# Patient Record
Sex: FEMALE | Race: WHITE | NOT HISPANIC OR LATINO | Employment: OTHER | URBAN - METROPOLITAN AREA
[De-identification: names, ages, dates, MRNs, and addresses within clinical notes are randomized per-mention and may not be internally consistent; named-entity substitution may affect disease eponyms.]

---

## 2017-01-10 ENCOUNTER — OFFICE VISIT - HEALTHEAST (OUTPATIENT)
Dept: FAMILY MEDICINE | Facility: CLINIC | Age: 82
End: 2017-01-10

## 2017-01-10 ENCOUNTER — COMMUNICATION - HEALTHEAST (OUTPATIENT)
Dept: FAMILY MEDICINE | Facility: CLINIC | Age: 82
End: 2017-01-10

## 2017-01-10 DIAGNOSIS — R35.0 URINARY FREQUENCY: ICD-10-CM

## 2017-01-10 DIAGNOSIS — R42 VERTIGO: ICD-10-CM

## 2017-01-10 DIAGNOSIS — C73 MALIGNANT NEOPLASM OF THYROID GLAND (H): ICD-10-CM

## 2017-01-10 ASSESSMENT — MIFFLIN-ST. JEOR: SCORE: 1037.25

## 2017-01-11 ENCOUNTER — COMMUNICATION - HEALTHEAST (OUTPATIENT)
Dept: FAMILY MEDICINE | Facility: CLINIC | Age: 82
End: 2017-01-11

## 2017-03-08 ENCOUNTER — COMMUNICATION - HEALTHEAST (OUTPATIENT)
Dept: FAMILY MEDICINE | Facility: CLINIC | Age: 82
End: 2017-03-08

## 2017-03-08 DIAGNOSIS — R42 VERTIGO: ICD-10-CM

## 2017-03-13 ENCOUNTER — OFFICE VISIT - HEALTHEAST (OUTPATIENT)
Dept: FAMILY MEDICINE | Facility: CLINIC | Age: 82
End: 2017-03-13

## 2017-03-13 DIAGNOSIS — H83.03 VESTIBULITIS OF EAR, BILATERAL: ICD-10-CM

## 2017-03-13 DIAGNOSIS — R42 VERTIGO: ICD-10-CM

## 2018-01-29 ENCOUNTER — COMMUNICATION - HEALTHEAST (OUTPATIENT)
Dept: FAMILY MEDICINE | Facility: CLINIC | Age: 83
End: 2018-01-29

## 2018-01-29 DIAGNOSIS — C73 MALIGNANT NEOPLASM OF THYROID GLAND (H): ICD-10-CM

## 2018-01-31 ENCOUNTER — COMMUNICATION - HEALTHEAST (OUTPATIENT)
Dept: FAMILY MEDICINE | Facility: CLINIC | Age: 83
End: 2018-01-31

## 2018-02-06 ENCOUNTER — OFFICE VISIT - HEALTHEAST (OUTPATIENT)
Dept: FAMILY MEDICINE | Facility: CLINIC | Age: 83
End: 2018-02-06

## 2018-02-06 DIAGNOSIS — R42 VERTIGO: ICD-10-CM

## 2018-02-06 DIAGNOSIS — C73 MALIGNANT NEOPLASM OF THYROID GLAND (H): ICD-10-CM

## 2018-02-06 DIAGNOSIS — R03.0 ELEVATED BLOOD PRESSURE READING: ICD-10-CM

## 2018-07-02 ENCOUNTER — COMMUNICATION - HEALTHEAST (OUTPATIENT)
Dept: FAMILY MEDICINE | Facility: CLINIC | Age: 83
End: 2018-07-02

## 2018-07-02 DIAGNOSIS — Z90.11 H/O RIGHT MASTECTOMY: ICD-10-CM

## 2018-07-10 ENCOUNTER — COMMUNICATION - HEALTHEAST (OUTPATIENT)
Dept: FAMILY MEDICINE | Facility: CLINIC | Age: 83
End: 2018-07-10

## 2018-07-10 DIAGNOSIS — Z90.11 H/O MASTECTOMY, RIGHT: ICD-10-CM

## 2018-07-10 DIAGNOSIS — C73 MALIGNANT NEOPLASM OF THYROID GLAND (H): ICD-10-CM

## 2019-02-11 ENCOUNTER — COMMUNICATION - HEALTHEAST (OUTPATIENT)
Dept: FAMILY MEDICINE | Facility: CLINIC | Age: 84
End: 2019-02-11

## 2019-02-11 DIAGNOSIS — C73 MALIGNANT NEOPLASM OF THYROID GLAND (H): ICD-10-CM

## 2019-03-11 ENCOUNTER — OFFICE VISIT - HEALTHEAST (OUTPATIENT)
Dept: FAMILY MEDICINE | Facility: CLINIC | Age: 84
End: 2019-03-11

## 2019-03-11 DIAGNOSIS — I48.20 CHRONIC ATRIAL FIBRILLATION (H): ICD-10-CM

## 2019-03-11 DIAGNOSIS — Z00.00 ENCOUNTER FOR MEDICARE ANNUAL WELLNESS EXAM: ICD-10-CM

## 2019-03-11 DIAGNOSIS — C73 MALIGNANT NEOPLASM OF THYROID GLAND (H): ICD-10-CM

## 2019-03-11 LAB
ALBUMIN SERPL-MCNC: 4.1 G/DL (ref 3.5–5)
ALBUMIN UR-MCNC: ABNORMAL MG/DL
ALP SERPL-CCNC: 67 U/L (ref 45–120)
ALT SERPL W P-5'-P-CCNC: 15 U/L (ref 0–45)
ANION GAP SERPL CALCULATED.3IONS-SCNC: 10 MMOL/L (ref 5–18)
APPEARANCE UR: ABNORMAL
AST SERPL W P-5'-P-CCNC: 15 U/L (ref 0–40)
ATRIAL RATE - MUSE: 72 BPM
BACTERIA #/AREA URNS HPF: ABNORMAL HPF
BILIRUB SERPL-MCNC: 0.6 MG/DL (ref 0–1)
BILIRUB UR QL STRIP: NEGATIVE
BUN SERPL-MCNC: 15 MG/DL (ref 8–28)
CALCIUM SERPL-MCNC: 10.2 MG/DL (ref 8.5–10.5)
CHLORIDE BLD-SCNC: 104 MMOL/L (ref 98–107)
CO2 SERPL-SCNC: 27 MMOL/L (ref 22–31)
COLOR UR AUTO: YELLOW
CREAT SERPL-MCNC: 0.87 MG/DL (ref 0.6–1.1)
DIASTOLIC BLOOD PRESSURE - MUSE: NORMAL MMHG
ERYTHROCYTE [DISTWIDTH] IN BLOOD BY AUTOMATED COUNT: 12.4 % (ref 11–14.5)
GFR SERPL CREATININE-BSD FRML MDRD: >60 ML/MIN/1.73M2
GLUCOSE BLD-MCNC: 89 MG/DL (ref 70–125)
GLUCOSE UR STRIP-MCNC: NEGATIVE MG/DL
HCT VFR BLD AUTO: 43.7 % (ref 35–47)
HGB BLD-MCNC: 13.9 G/DL (ref 12–16)
HGB UR QL STRIP: NEGATIVE
INTERPRETATION ECG - MUSE: NORMAL
KETONES UR STRIP-MCNC: NEGATIVE MG/DL
LEUKOCYTE ESTERASE UR QL STRIP: ABNORMAL
MCH RBC QN AUTO: 29 PG (ref 27–34)
MCHC RBC AUTO-ENTMCNC: 31.8 G/DL (ref 32–36)
MCV RBC AUTO: 91 FL (ref 80–100)
NITRATE UR QL: NEGATIVE
P AXIS - MUSE: 74 DEGREES
PH UR STRIP: 6.5 [PH] (ref 5–8)
PLATELET # BLD AUTO: 244 THOU/UL (ref 140–440)
PMV BLD AUTO: 8.7 FL (ref 7–10)
POTASSIUM BLD-SCNC: 4.8 MMOL/L (ref 3.5–5)
PR INTERVAL - MUSE: 200 MS
PROT SERPL-MCNC: 7.1 G/DL (ref 6–8)
QRS DURATION - MUSE: 82 MS
QT - MUSE: 396 MS
QTC - MUSE: 433 MS
R AXIS - MUSE: -5 DEGREES
RBC # BLD AUTO: 4.79 MILL/UL (ref 3.8–5.4)
RBC #/AREA URNS AUTO: ABNORMAL HPF
SODIUM SERPL-SCNC: 141 MMOL/L (ref 136–145)
SP GR UR STRIP: 1.02 (ref 1–1.03)
SQUAMOUS #/AREA URNS AUTO: ABNORMAL LPF
SYSTOLIC BLOOD PRESSURE - MUSE: NORMAL MMHG
T AXIS - MUSE: 45 DEGREES
T4 FREE SERPL-MCNC: 1.3 NG/DL (ref 0.7–1.8)
TSH SERPL DL<=0.005 MIU/L-ACNC: 0.23 UIU/ML (ref 0.3–5)
UROBILINOGEN UR STRIP-ACNC: ABNORMAL
VENTRICULAR RATE- MUSE: 72 BPM
WBC #/AREA URNS AUTO: ABNORMAL HPF
WBC: 7 THOU/UL (ref 4–11)

## 2019-03-11 RX ORDER — MECLIZINE HCL 12.5 MG 12.5 MG/1
12.5 TABLET ORAL 3 TIMES DAILY PRN
Qty: 30 TABLET | Refills: 0 | Status: SHIPPED | OUTPATIENT
Start: 2019-03-11

## 2019-03-11 ASSESSMENT — MIFFLIN-ST. JEOR: SCORE: 991.22

## 2019-03-12 LAB — BACTERIA SPEC CULT: NORMAL

## 2019-08-08 ENCOUNTER — OFFICE VISIT - HEALTHEAST (OUTPATIENT)
Dept: GERIATRICS | Facility: CLINIC | Age: 84
End: 2019-08-08

## 2019-08-08 DIAGNOSIS — G93.40 ACUTE ENCEPHALOPATHY: ICD-10-CM

## 2019-08-08 DIAGNOSIS — N30.00 ACUTE CYSTITIS WITHOUT HEMATURIA: ICD-10-CM
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
## 2019-08-08 DIAGNOSIS — G60.3 IDIOPATHIC PROGRESSIVE NEUROPATHY: ICD-10-CM

## 2019-08-08 DIAGNOSIS — E03.9 HYPOTHYROIDISM, UNSPECIFIED TYPE: ICD-10-CM

## 2019-08-09 ENCOUNTER — RECORDS - HEALTHEAST (OUTPATIENT)
Dept: LAB | Facility: CLINIC | Age: 84
End: 2019-08-09

## 2019-08-09 ENCOUNTER — OFFICE VISIT - HEALTHEAST (OUTPATIENT)
Dept: GERIATRICS | Facility: CLINIC | Age: 84
End: 2019-08-09
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
## 2019-08-09 DIAGNOSIS — R53.81 PHYSICAL DEBILITY: ICD-10-CM

## 2019-08-09 DIAGNOSIS — G60.9 HEREDITARY AND IDIOPATHIC PERIPHERAL NEUROPATHY: ICD-10-CM

## 2019-08-09 DIAGNOSIS — I48.0 PAROXYSMAL ATRIAL FIBRILLATION (H): ICD-10-CM

## 2019-08-09 LAB — TSH SERPL DL<=0.005 MIU/L-ACNC: 1.46 UIU/ML (ref 0.3–5)

## 2019-08-12 ENCOUNTER — OFFICE VISIT - HEALTHEAST (OUTPATIENT)
Dept: GERIATRICS | Facility: CLINIC | Age: 84
End: 2019-08-12

## 2019-08-12 DIAGNOSIS — R07.81 RIB PAIN: ICD-10-CM
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
## 2019-08-12 DIAGNOSIS — G60.9 HEREDITARY AND IDIOPATHIC PERIPHERAL NEUROPATHY: ICD-10-CM

## 2019-08-12 DIAGNOSIS — R53.81 PHYSICAL DEBILITY: ICD-10-CM

## 2019-08-14 ENCOUNTER — OFFICE VISIT - HEALTHEAST (OUTPATIENT)
Dept: GERIATRICS | Facility: CLINIC | Age: 84
End: 2019-08-14

## 2019-08-14 DIAGNOSIS — R26.81 GAIT INSTABILITY: ICD-10-CM
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
## 2019-08-14 DIAGNOSIS — R53.81 PHYSICAL DEBILITY: ICD-10-CM

## 2019-08-14 DIAGNOSIS — R12 PYROSIS: ICD-10-CM

## 2019-08-19 ENCOUNTER — OFFICE VISIT - HEALTHEAST (OUTPATIENT)
Dept: GERIATRICS | Facility: CLINIC | Age: 84
End: 2019-08-19

## 2019-08-19 DIAGNOSIS — I10 HYPERTENSION, UNSPECIFIED TYPE: ICD-10-CM

## 2019-08-19 DIAGNOSIS — R10.13 DYSPEPSIA: ICD-10-CM

## 2019-08-19 DIAGNOSIS — R53.81 PHYSICAL DEBILITY: ICD-10-CM

## 2019-08-19 DIAGNOSIS — G60.9 HEREDITARY AND IDIOPATHIC PERIPHERAL NEUROPATHY: ICD-10-CM

## 2019-08-21 ENCOUNTER — HOME CARE/HOSPICE - HEALTHEAST (OUTPATIENT)
Dept: HOME HEALTH SERVICES | Facility: HOME HEALTH | Age: 84
End: 2019-08-21

## 2019-08-21 ENCOUNTER — OFFICE VISIT - HEALTHEAST (OUTPATIENT)
Dept: GERIATRICS | Facility: CLINIC | Age: 84
End: 2019-08-21

## 2019-08-21 DIAGNOSIS — G60.9 HEREDITARY AND IDIOPATHIC PERIPHERAL NEUROPATHY: ICD-10-CM
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
## 2019-08-21 DIAGNOSIS — E03.9 HYPOTHYROIDISM, UNSPECIFIED TYPE: ICD-10-CM

## 2019-08-21 DIAGNOSIS — K51.919 ULCERATIVE COLITIS WITH COMPLICATION, UNSPECIFIED LOCATION (H): ICD-10-CM

## 2019-08-23 ENCOUNTER — AMBULATORY - HEALTHEAST (OUTPATIENT)
Dept: GERIATRICS | Facility: CLINIC | Age: 84
End: 2019-08-23

## 2019-08-23 ENCOUNTER — COMMUNICATION - HEALTHEAST (OUTPATIENT)
Dept: GERIATRICS | Facility: CLINIC | Age: 84
End: 2019-08-23

## 2019-08-23 ENCOUNTER — COMMUNICATION - HEALTHEAST (OUTPATIENT)
Dept: HOME HEALTH SERVICES | Facility: HOME HEALTH | Age: 84
End: 2019-08-23

## 2019-08-25 ENCOUNTER — HOME CARE/HOSPICE - HEALTHEAST (OUTPATIENT)
Dept: HOME HEALTH SERVICES | Facility: HOME HEALTH | Age: 84
End: 2019-08-25

## 2019-08-25 ENCOUNTER — COMMUNICATION - HEALTHEAST (OUTPATIENT)
Dept: HOME HEALTH SERVICES | Facility: HOME HEALTH | Age: 84
End: 2019-08-25

## 2019-08-25 ENCOUNTER — RECORDS - HEALTHEAST (OUTPATIENT)
Dept: ADMINISTRATIVE | Facility: OTHER | Age: 84
End: 2019-08-25

## 2019-08-26 ENCOUNTER — HOME CARE/HOSPICE - HEALTHEAST (OUTPATIENT)
Dept: HOME HEALTH SERVICES | Facility: HOME HEALTH | Age: 84
End: 2019-08-26

## 2019-08-26 ENCOUNTER — COMMUNICATION - HEALTHEAST (OUTPATIENT)
Dept: NURSING | Facility: CLINIC | Age: 84
End: 2019-08-26

## 2019-08-26 ENCOUNTER — COMMUNICATION - HEALTHEAST (OUTPATIENT)
Dept: PHYSICAL THERAPY | Facility: CLINIC | Age: 84
End: 2019-08-26

## 2019-08-26 ENCOUNTER — AMBULATORY - HEALTHEAST (OUTPATIENT)
Dept: CARE COORDINATION | Facility: CLINIC | Age: 84
End: 2019-08-26

## 2019-08-26 DIAGNOSIS — G60.9 HEREDITARY AND IDIOPATHIC PERIPHERAL NEUROPATHY: ICD-10-CM
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
## 2019-08-27 ENCOUNTER — HOME CARE/HOSPICE - HEALTHEAST (OUTPATIENT)
Dept: HOME HEALTH SERVICES | Facility: HOME HEALTH | Age: 84
End: 2019-08-27

## 2019-08-29 ENCOUNTER — HOME CARE/HOSPICE - HEALTHEAST (OUTPATIENT)
Dept: HOME HEALTH SERVICES | Facility: HOME HEALTH | Age: 84
End: 2019-08-29

## 2019-09-03 ENCOUNTER — HOME CARE/HOSPICE - HEALTHEAST (OUTPATIENT)
Dept: HOME HEALTH SERVICES | Facility: HOME HEALTH | Age: 84
End: 2019-09-03

## 2019-09-04 ENCOUNTER — OFFICE VISIT - HEALTHEAST (OUTPATIENT)
Dept: FAMILY MEDICINE | Facility: CLINIC | Age: 84
End: 2019-09-04

## 2019-09-04 DIAGNOSIS — Z09 HOSPITAL DISCHARGE FOLLOW-UP: ICD-10-CM

## 2019-09-04 DIAGNOSIS — Z87.440 HISTORY OF UTI: ICD-10-CM

## 2019-09-04 DIAGNOSIS — Z00.00 HEALTHCARE MAINTENANCE: ICD-10-CM

## 2019-09-04 LAB
ALBUMIN UR-MCNC: NEGATIVE MG/DL
APPEARANCE UR: CLEAR
BACTERIA #/AREA URNS HPF: ABNORMAL HPF
BILIRUB UR QL STRIP: NEGATIVE
COLOR UR AUTO: YELLOW
GLUCOSE UR STRIP-MCNC: NEGATIVE MG/DL
HGB UR QL STRIP: NEGATIVE
KETONES UR STRIP-MCNC: NEGATIVE MG/DL
LEUKOCYTE ESTERASE UR QL STRIP: ABNORMAL
NITRATE UR QL: NEGATIVE
PH UR STRIP: 5.5 [PH] (ref 5–8)
RBC #/AREA URNS AUTO: ABNORMAL HPF
SP GR UR STRIP: 1.01 (ref 1–1.03)
SQUAMOUS #/AREA URNS AUTO: ABNORMAL LPF
UROBILINOGEN UR STRIP-ACNC: ABNORMAL
WBC #/AREA URNS AUTO: ABNORMAL HPF

## 2019-09-05 ENCOUNTER — HOME CARE/HOSPICE - HEALTHEAST (OUTPATIENT)
Dept: HOME HEALTH SERVICES | Facility: HOME HEALTH | Age: 84
End: 2019-09-05

## 2019-09-05 LAB — BACTERIA SPEC CULT: NO GROWTH

## 2020-01-15 ENCOUNTER — RECORDS - HEALTHEAST (OUTPATIENT)
Dept: ADMINISTRATIVE | Facility: OTHER | Age: 85
End: 2020-01-15

## 2020-03-14 ENCOUNTER — COMMUNICATION - HEALTHEAST (OUTPATIENT)
Dept: FAMILY MEDICINE | Facility: CLINIC | Age: 85
End: 2020-03-14

## 2020-03-14 DIAGNOSIS — C73 MALIGNANT NEOPLASM OF THYROID GLAND (H): ICD-10-CM

## 2020-03-17 ENCOUNTER — COMMUNICATION - HEALTHEAST (OUTPATIENT)
Dept: FAMILY MEDICINE | Facility: CLINIC | Age: 85
End: 2020-03-17

## 2020-03-17 DIAGNOSIS — E03.9 HYPOTHYROIDISM, UNSPECIFIED TYPE: ICD-10-CM

## 2020-03-17 RX ORDER — LEVOTHYROXINE SODIUM 100 UG/1
TABLET ORAL
Qty: 90 TABLET | Refills: 0 | Status: SHIPPED | OUTPATIENT
Start: 2020-03-17 | End: 2021-09-11

## 2021-01-20 ENCOUNTER — RECORDS - HEALTHEAST (OUTPATIENT)
Dept: ADMINISTRATIVE | Facility: OTHER | Age: 86
End: 2021-01-20

## 2021-03-11 ENCOUNTER — AMBULATORY - HEALTHEAST (OUTPATIENT)
Dept: NURSING | Facility: CLINIC | Age: 86
End: 2021-03-11

## 2021-04-01 ENCOUNTER — AMBULATORY - HEALTHEAST (OUTPATIENT)
Dept: NURSING | Facility: CLINIC | Age: 86
End: 2021-04-01

## 2021-04-08 ENCOUNTER — COMMUNICATION - HEALTHEAST (OUTPATIENT)
Dept: FAMILY MEDICINE | Facility: CLINIC | Age: 86
End: 2021-04-08

## 2021-04-08 DIAGNOSIS — C73 MALIGNANT NEOPLASM OF THYROID GLAND (H): ICD-10-CM

## 2021-04-09 RX ORDER — LEVOTHYROXINE SODIUM 100 UG/1
TABLET ORAL
Qty: 90 TABLET | Refills: 3 | Status: SHIPPED | OUTPATIENT
Start: 2021-04-09

## 2021-05-26 VITALS
HEART RATE: 77 BPM | SYSTOLIC BLOOD PRESSURE: 106 MMHG | DIASTOLIC BLOOD PRESSURE: 56 MMHG | OXYGEN SATURATION: 96 % | TEMPERATURE: 98.5 F

## 2021-05-26 VITALS
HEART RATE: 69 BPM | SYSTOLIC BLOOD PRESSURE: 102 MMHG | TEMPERATURE: 97.9 F | OXYGEN SATURATION: 96 % | DIASTOLIC BLOOD PRESSURE: 58 MMHG

## 2021-05-30 VITALS — BODY MASS INDEX: 25.06 KG/M2 | WEIGHT: 146 LBS

## 2021-05-30 VITALS — BODY MASS INDEX: 24.92 KG/M2 | HEIGHT: 64 IN | WEIGHT: 146 LBS

## 2021-05-31 VITALS — BODY MASS INDEX: 24.2 KG/M2 | WEIGHT: 141 LBS

## 2021-05-31 NOTE — PROGRESS NOTES
The clinic Community Health Worker called the patient today at the request of the PCP to discuss possible Clinic Care Coordination enrollment.  The service was described to the patient and immediate needs were discussed.  The patient declined enrollement at this time.  The PCP is encouraged to refer in the future if the patient's needs change.    Priority: Routine Class: Internal Referral    Standing Status: Normal Status: Sent [2]    Ordering User: Karen Acosta RN Department: [facility]    Auth Provider: TRANG LOWERY Provider: Karen Acosta RN    Diagnosis: Fall  Hereditary and idiopathic peripheral neuropathy      Indications of Use:         Expected Date:        Order Comments: [facility]. Patient had a fall at home. Has a history of chronic atrial fibrillation, peptic ulcer disease, hypothyroidism, and vertigo. She went to TCU and now is going home with home care. Home care only for therapy. May need CCC for assistance with other help in home, resources, and medications.      Sched Instructions:   Patient Instructions:       Visit Types: CCC RN ASSESSMENT      Sched Instruct (Non-Radiology):         Referral Priority: Routine [1]      Additional Comments:        Order Summary Internal Referral, Routine, Primary Care, Continuity of Care

## 2021-05-31 NOTE — PROGRESS NOTES
[facility]    Facility:   [facility] [[id number]]   Code Status: DNR      CHIEF COMPLAINT/REASON FOR VISIT:  Chief Complaint   Patient presents with     Follow Up     [email or web address]       HISTORY:      HPI: Glory is a 89 y.o. female who I had the chance and pleasure to revisit with secondary to her hospitalization August 3 through August 7, 2019 secondary to mechanical fall.  The work-up was unremarkable except for UTI and did finish up her Cefdinir on August 10.  Asymptomatic.  Afebrile normotensive and also on room air.  Still does not have room independence.  Working out with therapy making pretty good progress.  Looks like eventually she will need a medical alert button to use.  I did talk with her daughter the other day and they are planning on bringing her back to the daughter's home.  Her appetite is good not having any rib pain.  No bowel or bladder issues.  She does take Pepcid for GERD and pyrosis.  Bowels are regular.  Very pleasant female did not have any other concerns today.    Past Medical History:   Diagnosis Date     Acute blood loss anemia 6/22/2014     Acute Duodenal Ulcer     Created by Conversion      Arthritis     osteoarthritis everywhere     Atrial fibrillation (H)     Created by Conversion      Breast cancer (H)      Hyponatremia 6/22/2014     Hypothyroidism     Created by Conversion      Nontoxic multinodular goiter     Created by Conversion      Peptic Ulcer     Created by Conversion      Peripheral Neuropathy     Created by Conversion      Thyroid Cancer     Created by Conversion      Ulcerative Colitis     Created by Conversion              Family History   Problem Relation Age of Onset     No Medical Problems Mother      No Medical Problems Father      Social History     Socioeconomic History     Marital status:      Spouse name: Not on file     Number of children: Not on file     Years of education: Not on file     Highest education level:  Not on file   Occupational History     Not on file   Social Needs     Financial resource strain: Not on file     Food insecurity:     Worry: Not on file     Inability: Not on file     Transportation needs:     Medical: Not on file     Non-medical: Not on file   Tobacco Use     Smoking status: Never Smoker     Smokeless tobacco: Never Used   Substance and Sexual Activity     Alcohol use: Yes     Alcohol/week: 0.0 oz     Comment: 1/month     Drug use: No     Sexual activity: Not Currently   Lifestyle     Physical activity:     Days per week: Not on file     Minutes per session: Not on file     Stress: Not on file   Relationships     Social connections:     Talks on phone: Not on file     Gets together: Not on file     Attends Catholic service: Not on file     Active member of club or organization: Not on file     Attends meetings of clubs or organizations: Not on file     Relationship status: Not on file     Intimate partner violence:     Fear of current or ex partner: Not on file     Emotionally abused: Not on file     Physically abused: Not on file     Forced sexual activity: Not on file   Other Topics Concern     Not on file   Social History Narrative     Not on file         Review of Systems  Currently she denies any chills or fever coughing wheezing chest pain dizziness or vertigo nausea vomiting diarrhea dysuria headache stiff neck or swollen glands.  History of A. fib peripheral neuropathy hypothyroidism    Current Outpatient Medications   Medication Sig Note     acetaminophen (TYLENOL) 500 MG tablet Take 500-1,000 mg by mouth every 6 (six) hours as needed for pain.      aspirin 81 MG EC tablet Take 1 tablet (81 mg total) by mouth daily.      calcium carbonate-vitamin D2 500 mg(1,250mg) -200 unit tablet Take 1 tablet by mouth daily.      cholecalciferol, vitamin D3, 1,000 unit tablet Take 1,000 Units by mouth daily.      cyanocobalamin (VITAMIN B-12) 500 MCG tablet Take 500 mcg by mouth daily.        7/7/2016:  .     famotidine (PEPCID) 20 MG tablet Take 1 tablet (20 mg total) by mouth daily.      levothyroxine (SYNTHROID, LEVOTHROID) 100 MCG tablet Take 100 mcg by mouth daily.      meclizine (ANTIVERT) 12.5 mg tablet Take 1 tablet (12.5 mg total) by mouth 3 (three) times a day as needed.      polyethylene glycol (MIRALAX) 17 gram packet Take 1 packet (17 g total) by mouth daily as needed (constipation).        .There were no vitals filed for this visit.  Blood pressure 122/63 pulse 72 temperature 98.8 saturation room air 96%  Physical Exam  Constitutional: No distress.   HENT:   Neck: Neck supple. No thyromegaly present.   Cardiovascular:   Occasional irregularity.  No edema.   Pulmonary/Chest: Breath sounds normal.   Abdominal: Bowel sounds are normal. There is no tenderness. There is no guarding.   Musculoskeletal:   Denies rib pain.  Generalized weakness.   Skin: Skin is warm and dry. No rash noted.   Psychiatric: Her behavior is normal.   LABS:   Lab Results   Component Value Date    WBC 9.4 08/04/2019    HGB 11.8 (L) 08/07/2019    HCT 35.8 08/04/2019    MCV 91 08/04/2019     08/07/2019     Lab Results   Component Value Date    TSH 1.46 08/09/2019         ASSESSMENT:      ICD-10-CM    1. Fall, subsequent encounter W19.XXXD    2. Gait instability R26.81    3. Physical debility R53.81    4. Pyrosis R12        PLAN:    She does have some cognitive impairment otherwise very pleasant female.  Had a good conversation today she is aware that she does need to put a call light on and get assistance for room ambulation as well as any type of care.  Looks like there is a potential for discharge within the near future otherwise the family is very involved with her care.  She will probably need a medical alert button.        Electronically signed by: Michael Duane Johnson, CNP

## 2021-05-31 NOTE — PROGRESS NOTES
[facility]      Facility:    [facility] [[id number]]  Code Status: DNR      Chief Complaint/Reason for Visit:  Chief Complaint   Patient presents with     H & P     Acute encephalopathy, urinary tract infection, atrial fibrillation, peripheral neuropathy, hypothyroidism, a fall, hypothyroidism.       HPI:   Glory is a 89 y.o. female who was recently admitted to the hospital on 8/3/2019.  Her daughter did come to her house in the day of admission and found her on the floor.  It is unclear how she got on the floor but she was then brought to the hospital.  She does have a history of atrial fibrillation which is chronic and currently on aspirin.  She is not on any anticoagulation she does have peptic ulcer disease as well as hypothyroidism.  Is unclear how long she was on the ground.  And she did imaging in the emergency room that showed no fractures.  This included ribs knees.  Head CT was basically negative for any intracranial hemorrhage or acute pathology.  She was then admitted to the hospital was found to have acute toxic encephalopathy and she did grow group B strep UTI.  She was treated with ceftriaxone and her encephalopathy did wax and wane.  On the day of discharge it did clear.  She did have that does have paroxysmal atrial fibrillation and the no anticoagulation secondary to risk of falls and she is not a candidate at this time.  Her overall condition in the hospital did improve and she was treated appropriately and transferred here to the TCU at [facility] in stable condition.    Patient is actually feels she is back to baseline at this time with further investigation I did ask her she remembered a fall and she says she does remember it and she just went clunk.  She does deny remembering any dizziness syncope near syncope or feeling faint.  And is still unclear how long she was down on the ground.    She is no real issues today at this time and does  remember who her daughter was in did remember my name.  That was pretty remarkable.  She may have an element of dementia and she denies any other issues at this time.    Past Medical History:  Past Medical History:   Diagnosis Date     Acute blood loss anemia 6/22/2014     Acute Duodenal Ulcer     Created by Conversion      Arthritis     osteoarthritis everywhere     Atrial fibrillation (H)     Created by Conversion      Breast cancer (H)      Hyponatremia 6/22/2014     Hypothyroidism     Created by Conversion      Nontoxic multinodular goiter     Created by Conversion      Peptic Ulcer     Created by Conversion      Peripheral Neuropathy     Created by Conversion      Thyroid Cancer     Created by Conversion      Ulcerative Colitis     Created by Conversion            Surgical History:  Past Surgical History:   Procedure Laterality Date     ABDOMINAL SURGERY      perforated ulcer     BREAST SURGERY       EYE SURGERY      cataracts     FRACTURE SURGERY       MASTECTOMY      Right side      VA MASTECTOMY, SIMPLE, COMPLETE      Description: Simple Mastectomy Right Breast;  Recorded: 10/30/2009;     VA TREAT INTER/SUBTROCH FX,W/PLATE/SCREW      Description: Open Treatment Of An Intertrochanteric Fracture;  Recorded: 07/24/2014;     SKIN BIOPSY       THYROID SURGERY      malignant nodule removed        Family History:   Family History   Problem Relation Age of Onset     No Medical Problems Mother      No Medical Problems Father        Social History:    Social History     Socioeconomic History     Marital status:      Spouse name: None     Number of children: None     Years of education: None     Highest education level: None   Occupational History     None   Social Needs     Financial resource strain: None     Food insecurity:     Worry: None     Inability: None     Transportation needs:     Medical: None     Non-medical: None   Tobacco Use     Smoking status: Never Smoker     Smokeless tobacco: Never Used    Substance and Sexual Activity     Alcohol use: Yes     Alcohol/week: 0.0 oz     Comment: 1/month     Drug use: No     Sexual activity: Not Currently   Lifestyle     Physical activity:     Days per week: None     Minutes per session: None     Stress: None   Relationships     Social connections:     Talks on phone: None     Gets together: None     Attends Cheondoism service: None     Active member of club or organization: None     Attends meetings of clubs or organizations: None     Relationship status: None     Intimate partner violence:     Fear of current or ex partner: None     Emotionally abused: None     Physically abused: None     Forced sexual activity: None   Other Topics Concern     None   Social History Narrative     None          Review of Systems   Constitutional:        Positive for right rib pain but is very mild and well-controlled.  She denies any fevers chills nausea vomiting diarrhea change in vision hearing taste or smell weakness one side the other chest pain or shortness of breath.  She denies any congeners stool polyphagia polydipsia polyuria depression or anxiety and she denies any urgency frequency or burning with urination.  The remainder the review of systems is negative.       Vitals:    08/08/19 0810   BP: 150/89   Pulse: 76   Resp: 18   Temp: 97.6  F (36.4  C)   SpO2: 96%       Physical Exam   Constitutional: No distress.   HENT:   Nose: Nose normal.   Mouth/Throat: No oropharyngeal exudate.   Small bruising ecchymosis on the top of her forehead.  Otherwise atraumatic and normocephalic.   Eyes: Conjunctivae are normal. Right eye exhibits no discharge. Left eye exhibits no discharge.   Neck: Neck supple. No thyromegaly present.   Cardiovascular:   Patient's heart sounds are irregularly irregular with adequate rate control.   Pulmonary/Chest: Effort normal and breath sounds normal. No respiratory distress. She exhibits no tenderness.   Abdominal: Bowel sounds are normal. She exhibits no  distension. There is no tenderness. There is no rebound.   Musculoskeletal: She exhibits no edema.   Patient is tender over the right rib lower rib cage but no abdominal tenderness.  There is no tenderness in the lower extremities or upper extremities bilateral.   Neurological:   Patient's cranial nerves II through XII are intact there is no pronator drift.  Finger-to-nose did not show any past-pointing and strength and reflexes were symmetrical bilateral.   Skin: Skin is warm and dry. She is not diaphoretic.   Psychiatric: She has a normal mood and affect. Her behavior is normal.       Medication List:  Current Outpatient Medications   Medication Sig     acetaminophen (TYLENOL) 500 MG tablet Take 500-1,000 mg by mouth every 6 (six) hours as needed for pain.     aspirin 81 MG EC tablet Take 1 tablet (81 mg total) by mouth daily.     calcium carbonate-vitamin D2 500 mg(1,250mg) -200 unit tablet Take 1 tablet by mouth daily.     cefdinir (OMNICEF) 300 MG capsule Take 1 capsule (300 mg total) by mouth Daily at 6:00 am for 3 days.     cholecalciferol, vitamin D3, 1,000 unit tablet Take 1,000 Units by mouth daily.     cyanocobalamin (VITAMIN B-12) 500 MCG tablet Take 500 mcg by mouth daily.            famotidine (PEPCID) 20 MG tablet Take 1 tablet (20 mg total) by mouth daily.     levothyroxine (SYNTHROID, LEVOTHROID) 100 MCG tablet Take 100 mcg by mouth daily.     meclizine (ANTIVERT) 12.5 mg tablet Take 1 tablet (12.5 mg total) by mouth 3 (three) times a day as needed.     polyethylene glycol (MIRALAX) 17 gram packet Take 1 packet (17 g total) by mouth daily as needed (constipation).       Labs: Hospital laboratory values are as follows and 7 7 hemoglobin is 9.9, platelets 193,000.  On admission to the hospital white count was 13.9 and troponin 0 0.15 without any signs of acute coronary syndrome.  ASIC metabolic profile was within normal limits and urine culture grew out greater than 100 group B  streptococcus.      Assessment:    ICD-10-CM    1. Fall, subsequent encounter W19.XXXD    2. Acute encephalopathy G93.40    3. Acute cystitis without hematuria N30.00    4. Hypothyroidism, unspecified type E03.9    5. 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  Her acute encephalopathy seems to have resolved at this time and we will evaluate patient for cognitive deficits.  She is having no signs of acute cystitis at this time so no further work-up noted and she will complete her antibiotics.  As far as her hypothyroid is concerned she seems euthyroid by exam I do not have her last TSH.  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  I will continue to monitor above medical problems and no other changes to care plan at this time.        Electronically signed by: Javier Golden DO

## 2021-05-31 NOTE — PROGRESS NOTES
[facility]    Facility:   [facility] [[id number]]   Code Status: DNR      CHIEF COMPLAINT/REASON FOR VISIT:  Chief Complaint   Patient presents with     Follow Up     rehab, [facility] fall       HISTORY:      HPI: Glory is a 89 y.o. female who I had the chance to visit with secondary to her hospitalization August 3 through August 7, 2019 secondary to mechanical fall.  During her work-up she did have a CT of the head without contrast which did not show any intracranial hemorrhage masses or effects.  No acute infarct although does have unchanged moderate diffuse parenchymal volume loss.  The right rib x-rays did not show any fractures.  The hip and knee are also without fractures.  She was diagnosed with acute toxic encephalopathy with group B strep UTI and was started on ceftriaxone.  She will finish up her Omnicef on August 10, 2019.  She does have a history of paroxysmal atrial fibrillation and is without anticoagulation.  She was in sinus rhythm on admission.  The chads score is at least 4.  Has a history of GI bleed.  Regarding the right sided chest wall pain the x-rays were negative pain did slowly improve.  The fall was mechanical presumed to just be vertigo-like possible of vestibular rehab in the future.  She also has a history of dyspepsia did have one episode of dyspepsia was started on Pepcid with improvement.  Regarding her EKG she did have inverted T waves troponins were negative.  She currently is in the transitional care unit.  Had a pleasant visit today overall she is really not having any significant rib pain.  She states her appetite is improving no bowel or bladder problems.  We did talk about her current living situation as well as she grew up in [place] went to [employer].  She has been normotensive and afebrile.  Not receiving any meclizine Tylenol or MiraLAX.  She will finish up her Omnicef on August 10.  She still is on Pepcid and is  asymptomatic.  She is on a couple multivitamins and minerals.  She does have hypothyroidism there is a TSH ordered today and currently is on Synthroid 100 mcg.    Past Medical History:   Diagnosis Date     Acute blood loss anemia 6/22/2014     Acute Duodenal Ulcer     Created by Conversion      Arthritis     osteoarthritis everywhere     Atrial fibrillation (H)     Created by Conversion      Breast cancer (H)      Hyponatremia 6/22/2014     Hypothyroidism     Created by Conversion      Nontoxic multinodular goiter     Created by Conversion      Peptic Ulcer     Created by Conversion      Peripheral Neuropathy     Created by Conversion      Thyroid Cancer     Created by Conversion      Ulcerative Colitis     Created by Conversion              Family History   Problem Relation Age of Onset     No Medical Problems Mother      No Medical Problems Father      Social History     Socioeconomic History     Marital status:      Spouse name: Not on file     Number of children: Not on file     Years of education: Not on file     Highest education level: Not on file   Occupational History     Not on file   Social Needs     Financial resource strain: Not on file     Food insecurity:     Worry: Not on file     Inability: Not on file     Transportation needs:     Medical: Not on file     Non-medical: Not on file   Tobacco Use     Smoking status: Never Smoker     Smokeless tobacco: Never Used   Substance and Sexual Activity     Alcohol use: Yes     Alcohol/week: 0.0 oz     Comment: 1/month     Drug use: No     Sexual activity: Not Currently   Lifestyle     Physical activity:     Days per week: Not on file     Minutes per session: Not on file     Stress: Not on file   Relationships     Social connections:     Talks on phone: Not on file     Gets together: Not on file     Attends Scientology service: Not on file     Active member of club or organization: Not on file     Attends meetings of clubs or organizations: Not on file      Relationship status: Not on file     Intimate partner violence:     Fear of current or ex partner: Not on file     Emotionally abused: Not on file     Physically abused: Not on file     Forced sexual activity: Not on file   Other Topics Concern     Not on file   Social History Narrative     Not on file         Review of Systems  Currently she denies any chills or fever coughing wheezing chest pain dizziness or vertigo nausea vomiting diarrhea dysuria headache stiff neck or swollen glands.  History of A. fib peripheral neuropathy hypothyroidism    Current Outpatient Medications   Medication Sig Note     acetaminophen (TYLENOL) 500 MG tablet Take 500-1,000 mg by mouth every 6 (six) hours as needed for pain.      aspirin 81 MG EC tablet Take 1 tablet (81 mg total) by mouth daily.      calcium carbonate-vitamin D2 500 mg(1,250mg) -200 unit tablet Take 1 tablet by mouth daily.      cefdinir (OMNICEF) 300 MG capsule Take 1 capsule (300 mg total) by mouth Daily at 6:00 am for 3 days.      cholecalciferol, vitamin D3, 1,000 unit tablet Take 1,000 Units by mouth daily.      cyanocobalamin (VITAMIN B-12) 500 MCG tablet Take 500 mcg by mouth daily.        7/7/2016: .     famotidine (PEPCID) 20 MG tablet Take 1 tablet (20 mg total) by mouth daily.      levothyroxine (SYNTHROID, LEVOTHROID) 100 MCG tablet Take 100 mcg by mouth daily.      meclizine (ANTIVERT) 12.5 mg tablet Take 1 tablet (12.5 mg total) by mouth 3 (three) times a day as needed.      polyethylene glycol (MIRALAX) 17 gram packet Take 1 packet (17 g total) by mouth daily as needed (constipation).        .There were no vitals filed for this visit.  Blood pressure 115/63 pulse 90 temperature 98.9 saturation room air 95%  Physical Exam   Constitutional: No distress.   HENT:   Head: Normocephalic.   Eyes: Pupils are equal, round, and reactive to light.   Neck: Neck supple. No thyromegaly present.   Cardiovascular:   Occasional irregularity.  No edema.    Pulmonary/Chest: Breath sounds normal.   Abdominal: Bowel sounds are normal. There is no tenderness. There is no guarding.   Musculoskeletal:   Denies rib pain.  Generalized weakness.   Lymphadenopathy:     She has no cervical adenopathy.   Skin: Skin is warm and dry. No rash noted.   Psychiatric: Her behavior is normal.         LABS:   Lab Results   Component Value Date    WBC 9.4 08/04/2019    HGB 11.8 (L) 08/07/2019    HCT 35.8 08/04/2019    MCV 91 08/04/2019     08/07/2019     Results for orders placed or performed during the hospital encounter of 08/03/19   Basic Metabolic Panel   Result Value Ref Range    Sodium 136 136 - 145 mmol/L    Potassium 3.8 3.5 - 5.0 mmol/L    Chloride 105 98 - 107 mmol/L    CO2 23 22 - 31 mmol/L    Anion Gap, Calculation 8 5 - 18 mmol/L    Glucose 85 70 - 125 mg/dL    Calcium 9.4 8.5 - 10.5 mg/dL    BUN 18 8 - 28 mg/dL    Creatinine 0.72 0.60 - 1.10 mg/dL    GFR MDRD Af Amer >60 >60 mL/min/1.73m2    GFR MDRD Non Af Amer >60 >60 mL/min/1.73m2       Lab Results   Component Value Date    ALBUMIN 4.1 03/11/2019     Lab Results   Component Value Date    ALT 15 03/11/2019    AST 15 03/11/2019    ALKPHOS 67 03/11/2019    BILITOT 0.6 03/11/2019     Lab Results   Component Value Date    TSH 0.23 (L) 03/11/2019         ASSESSMENT:      ICD-10-CM    1. Fall, subsequent encounter W19.XXXD    2. Paroxysmal atrial fibrillation (H) I48.0    3. Peripheral Neuropathy G60.9    4. Physical debility R53.81        PLAN:    There is a TSH ordered for today the results not yet back by this dictation see results above her most recent TSH.  Continue to manage and follow her other chronic medical conditions.  She will finish up the Omnicef on August 10.      Electronically signed by: Michael Duane Johnson, CNP

## 2021-05-31 NOTE — PROGRESS NOTES
[facility] Patient Outreach:     Discharge Date::  8/22/19      Reason for TCU stay (discharge diagnosis)::  Fall, encephalopathy, UTI      Are you feeling better, the same or worse since your discharge?:  Patient is feeling better          As part of your discharge plan, did they discuss home care with you?: Yes        Have your seen them yet, or are they scheduled to visit?: Yes                Do you have any follow up visits scheduled with your PCP or Specialist?:  No          I'm glad to hear you're doing well and we want you to continue to do well. Your PCP would like to see you for a follow-up visit. Can we help set that up for your today?: Yes            (RN) Patient transferred to [facility]? **If immediate concers (e.g. patient is feeling worse and/or not taking new medictations), send in basket message to PCP with quick summary of concern.: Yes

## 2021-05-31 NOTE — PROGRESS NOTES
[facility]    Facility:   [facility] [[id number]]   Code Status: DNR      CHIEF COMPLAINT/REASON FOR VISIT:  Chief Complaint   Patient presents with     Follow Up     rehab, [facility] fall       HISTORY:      HPI: Glory is a 89 y.o. female who I had the pleasure to revisit with secondary to her hospitalization August 3 through August 7 2019-second mechanical fall.  She still does have some rib pain secondary to the fall really at this point it is decreased she is feeling pretty good overall.  Very delightful female has been a chance to talk about her chronic medical conditions as well as her work-up in the hospital as well as trying to work out in therapy.  She does receive about one Tylenol per day as needed for pain she did finish her Omnicef on August 10 secondary to her urinary tract infection.  No heartburn or reflux is on Pepcid.  Had a chance to talk about again the rehabilitation process as well as trying to have a discharge from here she is feeling pretty close but still not independent enough to be able to be discharged.    Past Medical History:   Diagnosis Date     Acute blood loss anemia 6/22/2014     Acute Duodenal Ulcer     Created by Conversion      Arthritis     osteoarthritis everywhere     Atrial fibrillation (H)     Created by Conversion      Breast cancer (H)      Hyponatremia 6/22/2014     Hypothyroidism     Created by Conversion      Nontoxic multinodular goiter     Created by Conversion      Peptic Ulcer     Created by Conversion      Peripheral Neuropathy     Created by Conversion      Thyroid Cancer     Created by Conversion      Ulcerative Colitis     Created by Conversion              Family History   Problem Relation Age of Onset     No Medical Problems Mother      No Medical Problems Father      Social History     Socioeconomic History     Marital status:      Spouse name: Not on file     Number of children: Not on file     Years of education:  Not on file     Highest education level: Not on file   Occupational History     Not on file   Social Needs     Financial resource strain: Not on file     Food insecurity:     Worry: Not on file     Inability: Not on file     Transportation needs:     Medical: Not on file     Non-medical: Not on file   Tobacco Use     Smoking status: Never Smoker     Smokeless tobacco: Never Used   Substance and Sexual Activity     Alcohol use: Yes     Alcohol/week: 0.0 oz     Comment: 1/month     Drug use: No     Sexual activity: Not Currently   Lifestyle     Physical activity:     Days per week: Not on file     Minutes per session: Not on file     Stress: Not on file   Relationships     Social connections:     Talks on phone: Not on file     Gets together: Not on file     Attends Holiness service: Not on file     Active member of club or organization: Not on file     Attends meetings of clubs or organizations: Not on file     Relationship status: Not on file     Intimate partner violence:     Fear of current or ex partner: Not on file     Emotionally abused: Not on file     Physically abused: Not on file     Forced sexual activity: Not on file   Other Topics Concern     Not on file   Social History Narrative     Not on file         Review of Systems  Currently she denies any chills or fever coughing wheezing chest pain dizziness or vertigo nausea vomiting diarrhea dysuria headache stiff neck or swollen glands.  History of A. fib peripheral neuropathy hypothyroidism           Current Outpatient Medications   Medication Sig Note     acetaminophen (TYLENOL) 500 MG tablet Take 500-1,000 mg by mouth every 6 (six) hours as needed for pain.       aspirin 81 MG EC tablet Take 1 tablet (81 mg total) by mouth daily.       calcium carbonate-vitamin D2 500 mg(1,250mg) -200 unit tablet Take 1 tablet by mouth daily.       cefdinir (OMNICEF) 300 MG capsule Take 1 capsule (300 mg total) by mouth Daily at 6:00 am for 3 days.       cholecalciferol,  vitamin D3, 1,000 unit tablet Take 1,000 Units by mouth daily.       cyanocobalamin (VITAMIN B-12) 500 MCG tablet Take 500 mcg by mouth daily.           7/7/2016: .     famotidine (PEPCID) 20 MG tablet Take 1 tablet (20 mg total) by mouth daily.       levothyroxine (SYNTHROID, LEVOTHROID) 100 MCG tablet Take 100 mcg by mouth daily.       meclizine (ANTIVERT) 12.5 mg tablet Take 1 tablet (12.5 mg total) by mouth 3 (three) times a day as needed.       polyethylene glycol (MIRALAX) 17 gram packet Take 1 packet (17 g total) by mouth daily as needed (constipation).        .There were no vitals filed for this visit.  Blood pressure 158/81 pulse 62 respirations 20 temperature 98.2 saturation room air 95%.  Systolic blood pressures ranging 103-158  Physical Exam  Constitutional: No distress.   HENT:   Eyes: Pupils are equal, round, and reactive to light.   Neck: Neck supple. No thyromegaly present.   Cardiovascular:   Occasional irregularity.  No edema.   Pulmonary/Chest: Breath sounds normal.   Abdominal: Bowel sounds are normal. There is no tenderness. There is no guarding.   Musculoskeletal:   Denies rib pain.  Generalized weakness.   Skin: Skin is warm and dry. No rash noted.   Psychiatric: Her behavior is normal.   LABS:   Lab Results   Component Value Date    WBC 9.4 08/04/2019    HGB 11.8 (L) 08/07/2019    HCT 35.8 08/04/2019    MCV 91 08/04/2019     08/07/2019     Lab Results   Component Value Date    TSH 1.46 08/09/2019         ASSESSMENT:      ICD-10-CM    1. Fall, subsequent encounter W19.XXXD    2. Rib pain R07.81    3. Peripheral Neuropathy G60.9    4. Physical debility R53.81        PLAN:    At this time continue to manage and follow her chronic medical conditions as well as history of falls.  Did finish up the Omnicef no current UTI symptoms.  As we go through her progress as well as the progression of her therapy at this time she is feeling pretty good overall but does not feel strong enough to be  able to be independent.  Continue to manage and follow      Electronically signed by: Michael Duane Johnson, CNP

## 2021-05-31 NOTE — TELEPHONE ENCOUNTER
Request for Orders    Who s Requesting: Home Care Physical Therapist    Orders being requested: 1w1, 2w1 for strengthening HEP, balance training, gait and stair training    Where to send Orders: please reply ok to this encounter asap

## 2021-05-31 NOTE — TELEPHONE ENCOUNTER
Patient started with homecare today    Sn 1wk1 only family does not want further nursing    PT eval and treat for gait, balance, weakness    Ot eval and treat for safety, adl's

## 2021-05-31 NOTE — TELEPHONE ENCOUNTER
New verbal order needed for Skilled Nursing start of care on 8/25/19 dt agency capacity. Patient declined to be vended to partnering agencies..    This is outside of the original 48 hour referral order from Dorothea.    Anuradha, RN  formerly [facility]  [phone]

## 2021-05-31 NOTE — PROGRESS NOTES
[facility]    Facility:   [facility] [place] [[id number]]   Code Status: DNR  PCP: Roland Moore MD   Phone: [phone]   Fax: [phone]      CHIEF COMPLAINT/REASON FOR VISIT:  Chief Complaint   Patient presents with     Discharge Summary       HISTORY COURSE:  Glory is a 89 y.o. female who I had the chance to visit with secondary to her hospitalization August 3 through August 7, 2019 secondary to mechanical fall.  During her work-up she did have a CT of the head without contrast which did not show any intracranial hemorrhage masses or effects.  No acute infarct although does have unchanged moderate diffuse parenchymal volume loss.  The right rib x-rays did not show any fractures.  The hip and knee are also without fractures.  She was diagnosed with acute toxic encephalopathy with group B strep UTI and was started on ceftriaxone.  She will finish up her Omnicef on August 10, 2019.  She does have a history of paroxysmal atrial fibrillation and is without anticoagulation.  She was in sinus rhythm on admission.  The chads score is at least 4.  Has a history of GI bleed.  Regarding the right sided chest wall pain the x-rays were negative pain did slowly improve.  The fall was mechanical presumed to just be vertigo-like possible of vestibular rehab in the future.  She also has a history of dyspepsia did have one episode of dyspepsia was started on Pepcid with improvement.  Regarding her EKG she did have inverted T waves troponins were negative.  .  She has not experienced any significant pain occasional take Tylenol.  No meclizine as needed.  Level for diet thin liquids.  Getting a number of multivitamins and minerals.  She has been normotensive afebrile and also on room air.  Very delightful female.  Good sense of humor.  At this point she has successfully participated with the rehabilitation services.  Review of Systems  Currently she denies any chills or fever coughing wheezing  chest pain dizziness or vertigo nausea vomiting diarrhea dysuria headache stiff neck or swollen glands.  History of A. fib peripheral neuropathy hypothyroidism  There were no vitals filed for this visit.  Blood pressure 116/68 pulse 73 temperature 97.6 she has been able to participate with the rehabilitation services and actually has not experienced any vertigo syncope or other weakness  Physical Exam   Constitutional: No distress.   HENT:   Cardiovascular:   Occasional irregularity.  No edema.   Pulmonary/Chest: Breath sounds normal.   Abdominal: There is no tenderness.   Musculoskeletal:    Ambulating the hallway with her front wheel walker.  Improved with her strength balance and conditioning.  Psychiatric: Her behavior is normal.    Lab Results   Component Value Date    WBC 9.4 08/04/2019    HGB 11.8 (L) 08/07/2019    HCT 35.8 08/04/2019    MCV 91 08/04/2019     08/07/2019     Results for orders placed or performed during the hospital encounter of 08/03/19   Basic Metabolic Panel   Result Value Ref Range    Sodium 136 136 - 145 mmol/L    Potassium 3.8 3.5 - 5.0 mmol/L    Chloride 105 98 - 107 mmol/L    CO2 23 22 - 31 mmol/L    Anion Gap, Calculation 8 5 - 18 mmol/L    Glucose 85 70 - 125 mg/dL    Calcium 9.4 8.5 - 10.5 mg/dL    BUN 18 8 - 28 mg/dL    Creatinine 0.72 0.60 - 1.10 mg/dL    GFR MDRD Af Amer >60 >60 mL/min/1.73m2    GFR MDRD Non Af Amer >60 >60 mL/min/1.73m2       Lab Results   Component Value Date    TSH 1.46 08/09/2019       MEDICATION LIST:  Current Outpatient Medications   Medication Sig     acetaminophen (TYLENOL) 500 MG tablet Take 500-1,000 mg by mouth every 6 (six) hours as needed for pain.     aspirin 81 MG EC tablet Take 1 tablet (81 mg total) by mouth daily.     calcium carbonate-vitamin D2 500 mg(1,250mg) -200 unit tablet Take 1 tablet by mouth daily.     cholecalciferol, vitamin D3, 1,000 unit tablet Take 1,000 Units by mouth daily.     cyanocobalamin (VITAMIN B-12) 500 MCG tablet  Take 500 mcg by mouth daily.            famotidine (PEPCID) 20 MG tablet Take 1 tablet (20 mg total) by mouth daily.     levothyroxine (SYNTHROID, LEVOTHROID) 100 MCG tablet Take 100 mcg by mouth daily.     meclizine (ANTIVERT) 12.5 mg tablet Take 1 tablet (12.5 mg total) by mouth 3 (three) times a day as needed.     polyethylene glycol (MIRALAX) 17 gram packet Take 1 packet (17 g total) by mouth daily as needed (constipation).       DISCHARGE DIAGNOSIS:    ICD-10-CM    1. Fall, subsequent encounter W19.XXXD    2. Hypothyroidism, unspecified type E03.9    3. Ulcerative colitis with complication, unspecified location (H) K51.919    4. Peripheral Neuropathy G60.9        MEDICAL EQUIPMENT NEEDS:  None    DISCHARGE PLAN/FACE TO FACE:  I certify that services are/were furnished while this patient was under the care of a physician and that a physician or an allowed non-physician practitioner (NPP), had a face-to-face encounter that meets the physician face-to-face encounter requirements. The encounter was in whole, or in part, related to the primary reason for home health. The patient is confined to his/her home and needs intermittent skilled nursing, physical therapy, speech-language pathology, or the continued need for occupational therapy. A plan of care has been established by a physician and is periodically reviewed by a physician.  Date of Face-to-Face Encounter: August 21, 2019    I certify that, based on my findings, the following services are medically necessary home health services: She will be discharging to home with an anticipated discharge date of August 22, 2019 with current medications he will also receive physical and occupational therapy and nursing.  The home.  She has not yet been identified.    My clinical findings support the need for the above skilled services because: (Please write a brief narrative summary that describes what the RN, PT, SLP, or other services will be doing in the home. A list of  diagnoses in this section does not meet the CMS requirements.)  She will require the skilled services secondary to her history of mechanical fall along with home safety transfers medication management and self-care deficits.    This patient is homebound because: (Please write a brief narrative summary describing the functional limitations as to why this patient is homebound and specifically what makes this patient homebound.)  Secondary to her chronic medical conditions including history of mechanical fall along with home safety transfers various exercises and medication management.    The patient is, or has been, under my care and I have initiated the establishment of the plan of care. This patient will be followed by a physician who will periodically review the plan of care.    Schedule follow up visit with primary care provider within 7 days to reestablish care.  She will follow-up with her primary care doctor regarding medication management and any future laboratory studies.  Her UTI has been asymptomatic she did finish her Omnicef on August 10, 2019.  Her appetite is good.  She also did order up with a Medi alert button.  Otherwise she did not have any other questions or concerns as we go through the progress as well as the sequence of her stay on the transitional care unit.  She did not have any other further questions.    Discharge coordination care greater than 30 minutes.  Electronically signed by: Michael Duane Johnson, CNP

## 2021-05-31 NOTE — PROGRESS NOTES
[facility]    Facility:   [facility] [[id number]]   Code Status: DNR      CHIEF COMPLAINT/REASON FOR VISIT:  Chief Complaint   Patient presents with     Follow Up     rehab, [facility] fall       HISTORY:      HPI: Glory is a 89 y.o. female who I had the pleasure to revisit with secondary to her hospitalization August 3 through August 7 2019 seconds mechanical fall.  She will finish up her antibiotics on August 10 secondary to UTI otherwise currently asymptomatic.  She does feel like she is gained a lot of room independence.  Denies any dizziness vertigo syncope.  Appetite is improving.  She is open to be able to go home soon.  Denies any pain and did take a Tylenol the 17th prior to that August 14.  No dizziness no meclizine as needed.  No bowel or bladder issues.  Pepcid is for dyspepsia.  Is on Synthroid 100 mcg.  As we go over the progress of her rehabilitation as well as her medications she does not have any other questions or other concerns at this time.  Very pleasant female.    Past Medical History:   Diagnosis Date     Acute blood loss anemia 6/22/2014     Acute Duodenal Ulcer     Created by Conversion      Arthritis     osteoarthritis everywhere     Atrial fibrillation (H)     Created by Conversion      Breast cancer (H)      Hyponatremia 6/22/2014     Hypothyroidism     Created by Conversion      Nontoxic multinodular goiter     Created by Conversion      Peptic Ulcer     Created by Conversion      Peripheral Neuropathy     Created by Conversion      Thyroid Cancer     Created by Conversion      Ulcerative Colitis     Created by Conversion              Family History   Problem Relation Age of Onset     No Medical Problems Mother      No Medical Problems Father      Social History     Socioeconomic History     Marital status:      Spouse name: Not on file     Number of children: Not on file     Years of education: Not on file     Highest education level: Not on  file   Occupational History     Not on file   Social Needs     Financial resource strain: Not on file     Food insecurity:     Worry: Not on file     Inability: Not on file     Transportation needs:     Medical: Not on file     Non-medical: Not on file   Tobacco Use     Smoking status: Never Smoker     Smokeless tobacco: Never Used   Substance and Sexual Activity     Alcohol use: Yes     Alcohol/week: 0.0 oz     Comment: 1/month     Drug use: No     Sexual activity: Not Currently   Lifestyle     Physical activity:     Days per week: Not on file     Minutes per session: Not on file     Stress: Not on file   Relationships     Social connections:     Talks on phone: Not on file     Gets together: Not on file     Attends Jew service: Not on file     Active member of club or organization: Not on file     Attends meetings of clubs or organizations: Not on file     Relationship status: Not on file     Intimate partner violence:     Fear of current or ex partner: Not on file     Emotionally abused: Not on file     Physically abused: Not on file     Forced sexual activity: Not on file   Other Topics Concern     Not on file   Social History Narrative     Not on file         Review of Systems  Currently she denies any chills or fever coughing wheezing chest pain dizziness or vertigo nausea vomiting diarrhea dysuria headache stiff neck or swollen glands.  History of A. fib peripheral neuropathy hypothyroidism    Current Outpatient Medications   Medication Sig Note     acetaminophen (TYLENOL) 500 MG tablet Take 500-1,000 mg by mouth every 6 (six) hours as needed for pain.      aspirin 81 MG EC tablet Take 1 tablet (81 mg total) by mouth daily.      calcium carbonate-vitamin D2 500 mg(1,250mg) -200 unit tablet Take 1 tablet by mouth daily.      cholecalciferol, vitamin D3, 1,000 unit tablet Take 1,000 Units by mouth daily.      cyanocobalamin (VITAMIN B-12) 500 MCG tablet Take 500 mcg by mouth daily.        7/7/2016: .      famotidine (PEPCID) 20 MG tablet Take 1 tablet (20 mg total) by mouth daily.      levothyroxine (SYNTHROID, LEVOTHROID) 100 MCG tablet Take 100 mcg by mouth daily.      meclizine (ANTIVERT) 12.5 mg tablet Take 1 tablet (12.5 mg total) by mouth 3 (three) times a day as needed.      polyethylene glycol (MIRALAX) 17 gram packet Take 1 packet (17 g total) by mouth daily as needed (constipation).        .There were no vitals filed for this visit.  Blood pressure 117/75 pulse 60 respirations 16 temperature 97.2 saturation room air 94%  Physical Exam   Constitutional: No distress.   HENT:   Cardiovascular:   Occasional irregularity.  No edema.   Pulmonary/Chest: Breath sounds normal.   Abdominal: Bowel sounds are normal. There is no tenderness. There is no guarding.   Musculoskeletal:   Denies rib pain.  Generalized weakness.   Skin: Skin is warm and dry. No rash noted.   Psychiatric: Her behavior is normal.    LABS:   Lab Results   Component Value Date    WBC 9.4 08/04/2019    HGB 11.8 (L) 08/07/2019    HCT 35.8 08/04/2019    MCV 91 08/04/2019     08/07/2019     Results for orders placed or performed during the hospital encounter of 08/03/19   Basic Metabolic Panel   Result Value Ref Range    Sodium 136 136 - 145 mmol/L    Potassium 3.8 3.5 - 5.0 mmol/L    Chloride 105 98 - 107 mmol/L    CO2 23 22 - 31 mmol/L    Anion Gap, Calculation 8 5 - 18 mmol/L    Glucose 85 70 - 125 mg/dL    Calcium 9.4 8.5 - 10.5 mg/dL    BUN 18 8 - 28 mg/dL    Creatinine 0.72 0.60 - 1.10 mg/dL    GFR MDRD Af Amer >60 >60 mL/min/1.73m2    GFR MDRD Non Af Amer >60 >60 mL/min/1.73m2       Lab Results   Component Value Date    TSH 1.46 08/09/2019     Lab Results   Component Value Date    ALBUMIN 4.1 03/11/2019         ASSESSMENT:      ICD-10-CM    1. Physical debility R53.81    2. Dyspepsia R10.13    3. Hypertension, unspecified type I10    4. Peripheral Neuropathy G60.9        PLAN:    No current changes are necessary.  Medications are doing  well.  She is moving about well.  Denies any pain or other issues.  She is open to be able to go home soon.  I am not aware of any recent care conference.      Electronically signed by: Michael Duane Johnson, CNP

## 2021-06-01 ENCOUNTER — RECORDS - HEALTHEAST (OUTPATIENT)
Dept: ADMINISTRATIVE | Facility: CLINIC | Age: 86
End: 2021-06-01

## 2021-06-01 NOTE — PROGRESS NOTES
Hospital Follow-up Visit:    Assessment/Plan:     1. Hospital discharge follow-up  2. History of UTI  Recently hospitalized for UTI with encephalopathy and fall. Pt asymptomatic prior to hospitalization with exception of significant fatigue. Pt reports feeling well at this time, no concerns reported. Pt and daughter both worried about potential for continued asymptomatic UTI and request repeat UA/UC - discussed unlikely to be positive given absence of symptoms but will do for reassurance. Pt will continue with home therapy as recommended. No change to medications today.   - Urinalysis-UC if Indicated  - Culture, Urine    3. Healthcare maintenance  Due for Tdap vaccine, administered today  - Tdap vaccine,  8yo or older,  IM       Jaqueline Echeverria MD  [facility]    Subjective:     Glory Mason is a 89 y.o. female who presents for a hospital discharge follow up.      Hospital/Nursing Home/ Rehab Facility: [facility]  Date of Admission: 8/3/19  Date of Discharge:87/19  Reason(s) for Admission:fall, UTI with encephalopathy - d/c to TCU and discharged home on 8/21/19            Do you have any problems taking your medication regularly?  None       Have you had any changes in your medication since discharge? None       Have you had any difficulty following your discharge or treatment plan?  No    Summary of hospitalization:  Hospital discharge summary reviewed     Diagnostic Tests/Treatments reviewed.  Follow up needed: None  Other Healthcare Providers Involved in Patient's Care: Patient Care Team:  Roland Moore MD as PCP - General (Family Medicine)  Johnson, Michael Duane, CNP as Assigned PCP    Update since discharge: {improved   No symptoms of urine infection at the time, except feeling fatigued. Daughter read her journal and it talked about extreme exhaustion. Feeling good since hospital discharge. No urinary issues now; working to drink more water and family bought cranberry juice as well. No  fevers. No sign of encephalopathy/confusion per family. Improvement in right rib pain. Still doing in home therapy - tomorrow is last day of home therapy, excited to be done with this.     Information from family, SNF, care coordination: pt and family as above     Post Discharge Medication Reconciliation: discharge medications reconciled, continue medications without change  Plan of care communicated with: patient and family    Objective:     Vitals:    09/04/19 1430   BP: 96/50   Pulse: 72   Weight: 141 lb 6.4 oz (64.1 kg)       Physical Exam:  General appearance: awake, NAD, accompanied by daughter  HEENT: atraumatic, normocephalic, no scleral icterus or injection, ears and nose grossly normal, moist mucous membranes  CV: RRR, no murmurs/rubs/gallops, normal S1 and S2  Lungs: CTAB, no wheezes or crackles, breathing comfortably on room air  Extremities: trace LE edema bilaterally, moving all extremities, strong peripheral pulses bilaterally  Skin: no rashes or lesions  Neuro: alert, CNs grossly intact, no focal deficits appreciated  Psych: normal mood/affect/behavior, answering questions appropriately, linear thought process      Coding guidelines for this visit:  Type of Medical   Decision Making Face-to-Face Visit       within 7 Days of discharge Face-to-Face Visit        within 14 days of discharge   Moderate Complexity [id number]   High Complexity [id number]       Electronically signed by Jaqueline Echeverria MD 09/05/19 2:35 PM

## 2021-06-02 VITALS — HEIGHT: 63 IN | WEIGHT: 141.1 LBS | BODY MASS INDEX: 25 KG/M2

## 2021-06-03 VITALS
BODY MASS INDEX: 24.27 KG/M2 | DIASTOLIC BLOOD PRESSURE: 50 MMHG | HEART RATE: 72 BPM | WEIGHT: 141.4 LBS | SYSTOLIC BLOOD PRESSURE: 96 MMHG

## 2021-06-06 NOTE — TELEPHONE ENCOUNTER
RN cannot approve Refill Request    RN can NOT refill this medication historical medication requested.       Jocelyne Mcconnell, [facility] Triage/Med Refill 3/16/2020    Requested Prescriptions   Pending Prescriptions Disp Refills     levothyroxine (SYNTHROID, LEVOTHROID) 100 MCG tablet [Pharmacy Med Name: LEVOTHYROXINE 0.100MG (100MCG) TAB] 90 tablet 1     Sig: TAKE 1 TABLET BY MOUTH DAILY       Thyroid Hormones Protocol Passed - 3/14/2020  3:14 AM        Passed - Provider visit in past 12 months or next 3 months     Last office visit with prescriber/PCP: 3/13/2017 Roland Moore MD OR same dept: 9/4/2019 Jaqueline Echeverria MD OR same specialty: 9/4/2019 Jaqueline Echeverria MD  Last physical: 3/11/2019 Last MTM visit: Visit date not found   Next visit within 3 mo: Visit date not found  Next physical within 3 mo: Visit date not found  Prescriber OR PCP: Roland Moore MD  Last diagnosis associated with med order: There are no diagnoses linked to this encounter.  If protocol passes may refill for 12 months if within 3 months of last provider visit (or a total of 15 months).             Passed - TSH on file in past 12 months for patient age 12 & older     TSH   Date Value Ref Range Status   08/09/2019 1.46 0.30 - 5.00 uIU/mL Final

## 2021-06-08 NOTE — PROGRESS NOTES
Assessment/Plan:     1. Vertigo  Cause uncertain at this point.  Advised patient she could continue to use meclizine up to 3 times a day as needed.  Will try Flonase.  We will get labs as below.  Did discuss further workup including carotid and vertebral artery imaging, vertigo PT or testing, referral to ENT.  At this time she declines as she is feeling better.  Also advised her that she could try to check and see if her pulse is regular when she does have symptoms as she does have a history of paroxysmal A. fib  - fluticasone (FLONASE) 50 mcg/actuation nasal spray; 1 spray into each nostril daily.  Dispense: 16 g; Refill: 2  - Thyroid Cascade  - Hemoglobin  - Urine,Microscopic    2. Thyroid Cancer  Will recheck thyroid [place], plan to keep thyroid suppressed, patient declines reimaging or further workup.  There are medication refilled    3. Urinary frequency  Some bacteria was found in urine.  Culture pending.  Will try Macrobid in the case that that may be causing patient's dizziness symptoms.  - Urinalysis-UC if Indicated  - Culture, Urine  - Urine,Microscopic  - nitrofurantoin, macrocrystal-monohydrate, (MACROBID) 100 MG capsule; Take 1 capsule (100 mg total) by mouth 2 (two) times a day for 5 days.  Dispense: 10 capsule; Refill: 0        Subjective:      Glory Mason is a 89 y.o. female comes in today with her son.  She is here for a follow-up on dizziness.  We saw her in July for similar.  She states that on December 27 she noticed it acting up again.  More specifically when she would turn her head or roll over in bed.  She denies feeling that she was going to pass out or room spinning sensation but can only describe it as feeling a little bit off balance.  She feels a little bit more tired but no chest pain shortness of breath palpitations sweating.  No recent fevers or illnesses.  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   She states that throughout her life she had a lot of problems with her ears.  She also states that she felt like her eyes are a little bit hard to focus but did just recently see an eye doctor and was told everything was okay.  She did express her concerns to her vision changes with them.  She does not have any runny nose.  No recent airplane rides or changes in elevation.  She has had atrial fibrillation in the past but has not had any recent palpitations.  Pulse is regular today.  Blood pressure has been controlled she has been checking it.  She states that she does feel a bit more urinary frequency and urgency and sometimes when she sits to undergo nothing will happen.  Not had any bowel changes.  Again she was told at one point that she had a calcified cyst in her thyroid that could have been cancer and that her thyroid should be mildly suppressed but that no further workup was needed.  She had had several ultrasounds which we reviewed but declines further workup as things have been stable.  She is willing to get labs.  No other new concerns today    Current Outpatient Prescriptions   Medication Sig Dispense Refill     acetaminophen (TYLENOL) 500 MG tablet Take 500-1,000 mg by mouth every 6 (six) hours as needed for pain.       aspirin 81 MG EC tablet Take 1 tablet (81 mg total) by mouth daily.  0     calcium carbonate-vitamin D2 500 mg(1,250mg) -200 unit tablet Take 1 tablet by mouth daily.       cholecalciferol, vitamin D3, 1,000 unit tablet Take 1,000 Units by mouth daily.       cyanocobalamin (VITAMIN B-12) 500 MCG tablet Take by mouth daily.       levothyroxine (SYNTHROID, LEVOTHROID) 100 MCG tablet Take 1 tablet (100 mcg total) by mouth daily. 90 tablet 0     omeprazole (PRILOSEC) 20 MG capsule Take 1 capsule (20 mg total) by mouth daily. 90 capsule 0     fluticasone (FLONASE) 50 mcg/actuation nasal spray 1 spray into each nostril daily. 16 g 2     nitrofurantoin, macrocrystal-monohydrate, (MACROBID) 100  MG capsule Take 1 capsule (100 mg total) by mouth 2 (two) times a day for 5 days. 10 capsule 0     No current facility-administered medications for this visit.        Past Medical History, Family History, and Social History reviewed.  Past Medical History   Diagnosis Date     Acute blood loss anemia 6/22/2014     Acute Duodenal Ulcer      Created by Conversion      Atrial fibrillation      Created by Conversion      Breast cancer      Hyponatremia 6/22/2014     Hypothyroidism      Created by Conversion      Nontoxic multinodular goiter      Created by Conversion      Peptic Ulcer      Created by Conversion      Peripheral Neuropathy      Created by Conversion      Thyroid Cancer      Created by Conversion      Ulcerative Colitis      Created by Conversion      Past Surgical History   Procedure Laterality Date     Pr mastectomy, simple, complete       Description: Simple Mastectomy Right Breast;  Recorded: 10/30/2009;     Abdominal surgery       perforated ulcer     Mastectomy       Right side      Thyroid surgery       malignant nodule removed      Pr treat inter/subtroch fx,w/plate/screw       Description: Open Treatment Of An Intertrochanteric Fracture;  Recorded: 07/24/2014;     Review of patient's allergies indicates no known allergies.  Family History   Problem Relation Age of Onset     No Medical Problems Mother      No Medical Problems Father      Social History     Social History     Marital status:      Spouse name: N/A     Number of children: N/A     Years of education: N/A     Occupational History     Not on file.     Social History Main Topics     Smoking status: Never Smoker     Smokeless tobacco: Never Used     Alcohol use 0.0 oz/week     0 Glasses of wine per week      Comment: 1/month     Drug use: No     Sexual activity: Not on file     Other Topics Concern     Not on file     Social History Narrative         Review of systems is as stated in HPI, and the remainder of the 10 system review is  "otherwise negative.    Objective:     Vitals:    01/10/17 1048   BP: 116/70   Patient Site: Left Arm   Patient Position: Sitting   Cuff Size: Adult Regular   Pulse: 78   SpO2: 96%   Weight: 146 lb (66.2 kg)   Height: 5' 4\" (1.626 m)    Body mass index is 25.06 kg/(m^2).    General Appearance:    Alert, cooperative, no distress, appears stated age   Head:    Normocephalic, without obvious abnormality, atraumatic   Eyes:    PERRL, EOM's intact   Ears:   bilateral tympanic membranes are dull with white sclerosis, otherwise normal external ear canals   Nose:   Mucosa normal, no drainage     or sinus tenderness   Throat:   Oropharynx is clear   Neck:   Supple, symmetrical, no adenopathy, no thyromegally, no carotid bruit        Lungs:     Clear to auscultation bilaterally, respirations unlabored        Heart:    Regular rate and rhythm, S1 and S2 normal, no murmur, rub    or gallop       Abdomen:     Soft, non-tender, bowel sounds active all four quadrants,     no masses, no organomegaly           Extremities:  using cane for ambulation, no cyanosis or edema   Pulses:   2+ and symmetric upper extremities   Skin  Neuro:   No rashes or lesions  Grossly normal cranial nerves and normal sensation, unable to reproduce patient's symptoms          This note has been dictated using voice recognition software. Any grammatical or context distortions are unintentional and inherent to the the software.   "

## 2021-06-09 NOTE — PROGRESS NOTES
Assessment:     1. Vestibulitis of ear, bilateral     2. Vertigo  meclizine (ANTIVERT) 12.5 mg tablet       Plan:     1. Vertigo  Based on no findings on the exam, other than the appropriate history.  I will place the patient on the following cerebral sedative to help with symptoms of intermittent dizziness  - meclizine (ANTIVERT) 12.5 mg tablet; Take 1 tablet (12.5 mg total) by mouth 3 (three) times a day as needed for dizziness or nausea.  Dispense: 30 tablet; Refill: 1    2. Vestibulitis of ear, bilateral  Treatment as above      Subjective:   Very pleasant first visit with this 89-year-old patient who gives a 3-4 week history of intermittent dizziness and lightheadedness when she puts her head down and sit up suddenly.  It does not occur during normal activities.  It doesn't occur when she gets up from a sitting position.  This mainly when she tips her head over.  She's had no nausea or vomiting.  She lives by herself but her children are close by and keep INR, but she manages her affairs all by herself.  Patient has been in good health.  She does have intermittent atrial fib and takes a baby aspirin for that and is on metoprolol.  She has no cardiovascular symptoms whatsoever.  No headache shortness of breath, dyspnea, chest pain, angina.  No abdominal complaints, no confusion.  She is very alert.  Mini-Mental exam perfectly normal.  Medical decision making was that I feel this is age-related vestibulitis.  She is responded in the past 2 mild cerebral set sedation and will use meclozine 12.5.  She'll follow-up with us if she develops any further symptomatology.  All medical questions were asked were answered I personally reviewed her chart.  Family history history taken by patient.  Daughter accompanies her    Review of Systems: A complete 14 point review of systems was obtained and is negative or as stated in the history of present illness.    Past Medical History:   Diagnosis Date     Acute blood loss anemia  6/22/2014     Acute Duodenal Ulcer     Created by Conversion      Atrial fibrillation     Created by Conversion      Breast cancer      Hyponatremia 6/22/2014     Hypothyroidism     Created by Conversion      Nontoxic multinodular goiter     Created by Conversion      Peptic Ulcer     Created by Conversion      Peripheral Neuropathy     Created by Conversion      Thyroid Cancer     Created by Conversion      Ulcerative Colitis     Created by Conversion      Family History   Problem Relation Age of Onset     No Medical Problems Mother      No Medical Problems Father      Past Surgical History:   Procedure Laterality Date     ABDOMINAL SURGERY      perforated ulcer     MASTECTOMY      Right side      TN MASTECTOMY, SIMPLE, COMPLETE      Description: Simple Mastectomy Right Breast;  Recorded: 10/30/2009;     TN TREAT INTER/SUBTROCH FX,W/PLATE/SCREW      Description: Open Treatment Of An Intertrochanteric Fracture;  Recorded: 07/24/2014;     THYROID SURGERY      malignant nodule removed      Social History   Substance Use Topics     Smoking status: Never Smoker     Smokeless tobacco: Never Used     Alcohol use 0.0 oz/week     0 Glasses of wine per week      Comment: 1/month         Objective:     Visit Vitals     /80     Pulse 80     Wt 146 lb (66.2 kg)     BMI 25.06 kg/m2       General Appearance:  Alert, cooperative, no distress  Head:  Normocephalic, no obvious abnormality  Ears: TM anatomy normal  Eyes:  PERRL, EOM's intact, conjunctiva and corneas clear  Nose:  Nares symmetrical, septum midline, mucosa pink, no sinus tenderness  Throat:  Lips, tongue, and mucosa are moist, pink, and intact  Neck:  Supple, symmetrical, trachea midline, no adenopathy; thyroid: no enlargement, symmetric,no tenderness/mass/nodules; no carotid bruit, no JVD  Back:  Symmetrical, no curvature, ROM normal, no CVA tenderness  Chest/Breast:  No mass or tenderness  Lungs:  Clear to auscultation bilaterally, respirations unlabored    Heart:  Normal PMI, patient has atrial fib with appropriate ventricular response.  However S1 and S2 normal, no murmurs, rubs, or gallops  Abdomen:  Soft, non-tender, bowel sounds active all four quadrants, no mass, or organomegaly  Musculoskeletal:  Tone and strength strong and symmetrical, all extremities  Lymphatic:  No adenopathy  Skin/Hair/Nails:  Skin warm, dry, and intact, no rashes  Neurologic:  Alert and oriented x3, no cranial nerve deficits, normal strength and tone, gait steady.  Romberg negative.  Patient walks with a cane due to a knee problem  Extremities:  No edema.  Blu's sign negative.    Genitourinary: deferred  Pulses:  Equal bilaterally           This note has been dictated using voice recognition software. Any grammatical or context distortions are unintentional and inherent to the the software.

## 2021-06-15 NOTE — PROGRESS NOTES
Assessment/Plan:     1. Thyroid Cancer  Recommended lab follow-up but patient declines.  Will respect her wishes at this time may consider at next visit.  Provided refill as below.  Patient is taking medication for thyroid suppression due to past history of thyroid cancer.  Declines imaging or follow-up with specialist.  - levothyroxine (SYNTHROID, LEVOTHROID) 100 MCG tablet; TAKE 1 TABLET(100 MCG) BY MOUTH DAILY  Dispense: 90 tablet; Refill: 3    2. Vertigo  Quite stable and patient uses this rarely less than once per month prefers to have refill on hand declines needing further workup.  - meclizine (ANTIVERT) 12.5 mg tablet; Take 1 tablet (12.5 mg total) by mouth 3 (three) times a day as needed for dizziness.  Dispense: 30 tablet; Refill: 1    3. Elevated blood pressure reading  Mildly elevated today this is new for her she states she will check a couple times at home and come back in about 2 weeks for blood pressure recheck she is otherwise asymptomatic will let us know if any concerning symptoms.      At this stage in patient's life she declines many tests.  Reviewed immunization update and basic labs and preventative care which she declines will let us know if she changes her mind.    Subjective:      Glory Mason is a 89 y.o. female comes in today with her daughter for review of medications and refills.  Feels well overall.  She is requesting refill of thyroid medication.  Patient's last labs were done a year ago and TSH was just slightly low T4 was normal.  Reviewed labs.  Advised recommended yearly lab update today but patient declines.  She states that she feels well and does not feel she needs to continue to have her thyroid levels checked.  Patient and daughter report a history of a calcified thyroid malignancy and she is currently taking thyroid supplementation to suppress her thyroid.  She feels no further growth of tissue in the thyroid area.  Does not regularly follow with endocrinology and has  not had recent imaging.  Also requests refill of meclizine.  She states that she uses it very sporadically.  About a year ago she had a bout of dizziness and vertigo and she states that every once in a while if she overexerts herself still feels slightly dizzy she likes to have it in hand and keep it as a safety blanket but she has really taken it.  She does not feel dizzy in office.  She states that she does not feel she needs further workup or dizziness at this time.  Blood pressure slightly elevated today which is not typical for patient.  States that she has somewhat worked up and not used to running around.  She does have blood pressure monitor at home.  Has history of atrial fibrillation has not felt any palpitations shortness of breath swelling chest pain or other concerns.  She generally feels well today.  No changes or concerns with bowel or bladder.  She continues to take vitamin supplementation and aspirin.  She has weaned herself off of most of her medications and wants limited medical care at this stage in her life.    Current Outpatient Prescriptions   Medication Sig Dispense Refill     acetaminophen (TYLENOL) 500 MG tablet Take 500-1,000 mg by mouth every 6 (six) hours as needed for pain.       aspirin 81 MG EC tablet Take 1 tablet (81 mg total) by mouth daily.  0     calcium carbonate-vitamin D2 500 mg(1,250mg) -200 unit tablet Take 1 tablet by mouth daily.       cholecalciferol, vitamin D3, 1,000 unit tablet Take 1,000 Units by mouth daily.       cyanocobalamin (VITAMIN B-12) 500 MCG tablet Take by mouth daily.       levothyroxine (SYNTHROID, LEVOTHROID) 100 MCG tablet TAKE 1 TABLET(100 MCG) BY MOUTH DAILY 90 tablet 3     meclizine (ANTIVERT) 12.5 mg tablet Take 1 tablet (12.5 mg total) by mouth 3 (three) times a day as needed for dizziness. 30 tablet 1     No current facility-administered medications for this visit.      No Known Allergies  Past Medical History, Family History, and Social History  reviewed.  Past Medical History:   Diagnosis Date     Acute blood loss anemia 6/22/2014     Acute Duodenal Ulcer     Created by Conversion      Atrial fibrillation     Created by Conversion      Breast cancer      Hyponatremia 6/22/2014     Hypothyroidism     Created by Conversion      Nontoxic multinodular goiter     Created by Conversion      Peptic Ulcer     Created by Conversion      Peripheral Neuropathy     Created by Conversion      Thyroid Cancer     Created by Conversion      Ulcerative Colitis     Created by Conversion      Past Surgical History:   Procedure Laterality Date     ABDOMINAL SURGERY      perforated ulcer     MASTECTOMY      Right side      TN MASTECTOMY, SIMPLE, COMPLETE      Description: Simple Mastectomy Right Breast;  Recorded: 10/30/2009;     TN TREAT INTER/SUBTROCH FX,W/PLATE/SCREW      Description: Open Treatment Of An Intertrochanteric Fracture;  Recorded: 07/24/2014;     THYROID SURGERY      malignant nodule removed      Review of patient's allergies indicates no known allergies.  Family History   Problem Relation Age of Onset     No Medical Problems Mother      No Medical Problems Father      Social History     Social History     Marital status:      Spouse name: N/A     Number of children: N/A     Years of education: N/A     Occupational History     Not on file.     Social History Main Topics     Smoking status: Never Smoker     Smokeless tobacco: Never Used     Alcohol use 0.0 oz/week     0 Glasses of wine per week      Comment: 1/month     Drug use: No     Sexual activity: Not on file     Other Topics Concern     Not on file     Social History Narrative         Review of systems is as stated in HPI, and the remainder of the 10 system review is otherwise negative.    Objective:     Vitals:    02/06/18 1142 02/06/18 1257   BP: 158/82 146/80   Patient Site: Right Arm    Patient Position: Sitting    Cuff Size: Adult Regular    Pulse: 83    SpO2: 93%    Weight: 141 lb (64 kg)      Body mass index is 24.2 kg/(m^2).  Wt Readings from Last 3 Encounters:   02/06/18 141 lb (64 kg)   03/13/17 146 lb (66.2 kg)   01/10/17 146 lb (66.2 kg)       General Appearance:    Alert, cooperative, no distress, appears stated age    Head:    Normocephalic, without obvious abnormality, atraumatic   Eyes:    PERRL, EOM's intact, no conjunctivitis    Ears:    Normalexternal ear canals   Nose:   Mucosa normal, no drainage     or sinus tenderness   Throat:   Oropharynx is clear   Neck:   Supple, symmetrical, no adenopathy, no thyromegally, no carotid bruit        Lungs:     Clear to auscultation bilaterally, respirations unlabored   Chest Wall:    No tenderness or deformity    Heart:    Regular rate and rhythm, S1 and S2 normal, no murmur, rub    or gallop                   Extremities:  Using cane, chronic degenerative changes, otherwise extremities normal, atraumatic, no cyanosis or edema   Pulses:   2+ and symmetric all extremities   Neuro:   cranial nerves grossly intact, grossly normal sensation and reflexes in extremities    Psych:   grossly normal mood and affect without acute anxiety or psychosis    Skin:   No rashes or lesions             This note has been dictated using voice recognition software. Any grammatical or context distortions are unintentional and inherent to the software.

## 2021-06-16 PROBLEM — R94.31 NONSPECIFIC ABNORMAL ELECTROCARDIOGRAM (ECG) (EKG): Status: ACTIVE | Noted: 2019-08-03
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
## 2021-06-16 PROBLEM — G93.40 ACUTE ENCEPHALOPATHY: Status: ACTIVE | Noted: 2019-08-05

## 2021-06-16 NOTE — TELEPHONE ENCOUNTER
RN cannot approve Refill Request    RN can NOT refill this medication PCP messaged that patient is overdue for Labs and Office Visit. Last office visit: 3/13/2017 Roland Moore MD Last Physical: 3/11/2019 Last MTM visit: Visit date not found Last visit same specialty: 9/4/2019 Jaqueline Echeverria MD.  Next visit within 3 mo: Visit date not found  Next physical within 3 mo: Visit date not found      Liz Wetzel, [facility] Triage/Med Refill 4/8/2021    Requested Prescriptions   Pending Prescriptions Disp Refills     levothyroxine (SYNTHROID, LEVOTHROID) 100 MCG tablet [Pharmacy Med Name: LEVOTHYROXINE 0.100MG (100MCG) TAB] 90 tablet 3     Sig: TAKE 1 TABLET BY MOUTH DAILY       Thyroid Hormones Protocol Failed - 4/8/2021  2:52 PM        Failed - Provider visit in past 12 months or next 3 months     Last office visit with prescriber/PCP: 3/13/2017 Roland Moore MD OR same dept: Visit date not found OR same specialty: 9/4/2019 Jaqueline Echeverria MD  Last physical: 3/11/2019 Last MTM visit: Visit date not found   Next visit within 3 mo: Visit date not found  Next physical within 3 mo: Visit date not found  Prescriber OR PCP: Roland Moore MD  Last diagnosis associated with med order: 1. Thyroid Cancer  - levothyroxine (SYNTHROID, LEVOTHROID) 100 MCG tablet [Pharmacy Med Name: LEVOTHYROXINE 0.100MG (100MCG) TAB]; TAKE 1 TABLET BY MOUTH DAILY  Dispense: 90 tablet; Refill: 3    If protocol passes may refill for 12 months if within 3 months of last provider visit (or a total of 15 months).             Failed - TSH on file in past 12 months for patient age 12 & older     TSH   Date Value Ref Range Status   08/09/2019 1.46 0.30 - 5.00 uIU/mL Final

## 2021-06-17 NOTE — PATIENT INSTRUCTIONS - HE
Patient Instructions by Roland Moore MD at 3/11/2019  9:40 AM     Author: Roland Moore MD Service: -- Author Type: Physician    Filed: 3/11/2019 10:15 AM Encounter Date: 3/11/2019 Status: Signed    : Roland Moore MD (Physician)         Patient Education     Exercise for a Healthier Heart  You may wonder how you can improve the health of your heart. If youre thinking about exercise, youre on the right track. You dont need to become an athlete, but you do need a certain amount of brisk exercise to help strengthen your heart. If you have been diagnosed with a heart condition, your doctor may recommend exercise to help stabilize your condition. To help make exercise a habit, choose safe, fun activities.       Be sure to check with your health care provider before starting an exercise program.    Why exercise?  Exercising regularly offers many healthy rewards. It can help you do all of the following:    Improve your blood cholesterol levels to help prevent further heart trouble    Lower your blood pressure to help prevent a stroke or heart attack    Control diabetes, or reduce your risk of getting this disease    Improve your heart and lung function    Reach and maintain a healthy weight    Make your muscles stronger and more limber so you can stay active    Prevent falls and fractures by slowing the loss of bone mass (osteoporosis)    Manage stress better  Exercise tips  Ease into your routine. Set small goals. Then build on them.  Exercise on most days. Aim for a total of 150 or more minutes of moderate to  vigorous intensity activity each week. Consider 40 minutes, 3 to 4 times a week. For best results, activity should last for 40 minutes on average. It is OK to work up to the 40 minute period over time. Examples of moderate-intensity activity is walking one mile in 15 minutes or 30 to 45 minutes of yard work.  Step up your daily activity level. Along with your exercise program, try being more active  throughout the day. Walk instead of drive. Do more household tasks or yard work.  Choose one or more activities you enjoy. Walking is one of the easiest things you can do. You can also try swimming, riding a bike, or taking an exercise class.  Stop exercising and call your doctor if you:    Have chest pain or feel dizzy or lightheaded    Feel burning, tightness, pressure, or heaviness in your chest, neck, shoulders, back, or arms    Have unusual shortness of breath    Have increased joint or muscle pain    Have palpitations or an irregular heartbeat      4604-9290 [employer]. [address] [place] [address]. All rights reserved. This information is not intended as a substitute for professional medical care. Always follow your healthcare professional's instructions.         Patient Education   Understanding [place] MyPlate  The USDA (US Department of Agriculture) has guidelines to help you make healthy food choices. These are called MyPlate. MyPlate shows the food groups that make up healthy meals using the image of a place setting. Before you eat, think about the healthiest choices for what to put onto your plate or into your cup or bowl. To learn more about building a healthy plate, visit www.choosemyplate.gov.       The Food Groups    Fruits: Any fruit or 100% fruit juice counts as part of the Fruit Group. Fruits may be fresh, canned, frozen, or dried, and may be whole, cut-up, or pureed. Make half your plate fruits and vegetables.    Vegetables: Any vegetable or 100% vegetable juice counts as a member of the Vegetable Group. Vegetables may be fresh, frozen, canned, or dried. They can be served raw or cooked and may be whole, cut-up, or mashed. Make half your plate fruits and vegetables.     Grains: All foods made from grains are part of the Grains Group. These include wheat, rice, oats, cornmeal, and barley such as bread, pasta, oatmeal, cereal, tortillas, and grits. Grains should be no more  than a quarter of your plate. At least half of your grains should be whole grains.    Protein: This group includes meat, poultry, seafood, beans and peas, eggs, processed soy products (like tofu), nuts (including nut butters), and seeds. Make protein choices no more than a quarter of your plate. Meat and poultry choices should be lean or low fat.    Dairy: All fluid milk products and foods made from milk that contain calcium, like yogurt and cheese are part of the Dairy Group. (Foods that have little calcium, such as cream, butter, and cream cheese, are not part of the group.) Most dairy choices should be low-fat or fat-free.    Oils: These are fats that are liquid at room temperature. They include canola, corn, olive, soybean, and sunflower oil. Foods that are mainly oil include mayonnaise, certain salad dressings, and soft margarines. You should have only 5 to 7 teaspoons of oils a day. You probably already get this much from the food you eat.  Use [employer] to Help Build Your Meals  The [employer] can help you plan and track your meals and activity. You can look up individual foods to see or compare their nutritional value. You can get guidelines for what and how much you should eat. You can compare your food choices. And you can assess personal physical activities and see ways you can improve. Go to www.[employer].gov/supertracker/.    5263-2148 [employer]. [address] [place] [address]. All rights reserved. This information is not intended as a substitute for professional medical care. Always follow your healthcare professional's instructions.           Patient Education   Activities of Daily Living  Your Health Risk Assessment indicates you have difficulties with activities of daily living such as eating, getting dressed, grooming, bathing, walking, or using the toilet. Please make a follow up appointment for us to address this issue in more detail.     Patient Education    Instrumental Activities of Daily Living  Your Health Risk Assessment indicates you have difficulties with instrumental activities of daily living which include laundry, housekeeping, banking, shopping, using the telephone, food preparation, transportation, or taking your own medications. Please make a follow up appointment for us to address this issue in more detail.    [employer] has resources available on the following website: https://www.healthSocialGO.org/caregivers.html     Also, here is a local agency that provides help with meals and other assistance:   [facility] Line: 834.946.1745     Patient Education   Signs of Hearing Loss  Hearing loss is a problem shared by many people. In fact, it is one of the most common health conditions, particularly as people age. Most people over age 65 have some hearing loss, and by age 80, almost everyone does. Because hearing loss usually occurs slowly over the years, you may not realize your hearing ability has gotten worse.       Have your hearing checked  Contact your [facility] care provider if you:    Have to strain to hear normal conversation.    Have to watch other peoples faces very carefully to follow what theyre saying.    Need to ask people to repeat what theyve said.    Often misunderstand what people are saying.    Turn the volume of the television or radio up so high that others complain.    Feel that people are mumbling when theyre talking to you.    Find that the effort to hear leaves you feeling tired and irritated.    Notice, when using the phone, that you hear better with 1 ear than the other.    9460-8682 [employer]. [address] [place] [address], [place] [address]. All rights reserved. This information is not intended as a substitute for professional medical care. Always follow your healthcare professional's instructions.           Advance Directive  Patients advance directive was discussed and I am comfortable with the patients wishes.  Patient  Education   Personalized Prevention Plan  You are due for the preventive services outlined below.  Your care team is available to assist you in scheduling these services.  If you have already completed any of these items, please share that information with your care team to update in your medical record.  Health Maintenance   Topic Date Due   ? ZOSTER VACCINES (1 of 2) 08/27/1974   ? DXA SCAN  08/27/1989   ? TD 18+ HE  08/01/2013   ? INFLUENZA VACCINE RULE BASED (1) 03/26/2021 (Originally 8/1/2018)   ? FALL RISK ASSESSMENT  03/11/2020   ? ADVANCE DIRECTIVES DISCUSSED WITH PATIENT  03/13/2022   ? PNEUMOCOCCAL POLYSACCHARIDE VACCINE AGE 65 AND OVER  Addressed   ? PNEUMOCOCCAL CONJUGATE VACCINE FOR ADULTS (PCV13 OR PREVNAR)  Addressed

## 2021-06-19 NOTE — LETTER
Letter by Johnson, Michael Duane, CNP at      Author: Johnson, Michael Duane, CNP Service: -- Author Type: --    Filed:  Encounter Date: 8/21/2019 Status: (Other)         Patient: Glory Mason   MR Number: [id number]   YOB: 1924   Date of Visit: 8/21/2019     [facility]    Facility:   [facility] [[id number]]   Code Status: DNR  PCP: Roland Moore MD   Phone: [phone]   Fax: [phone]      CHIEF COMPLAINT/REASON FOR VISIT:  Chief Complaint   Patient presents with   ? Discharge Summary       HISTORY COURSE:  Glory is a 89 y.o. female who I had the chance to visit with secondary to her hospitalization August 3 through August 7, 2019 secondary to mechanical fall.  During her work-up she did have a CT of the head without contrast which did not show any intracranial hemorrhage masses or effects.  No acute infarct although does have unchanged moderate diffuse parenchymal volume loss.  The right rib x-rays did not show any fractures.  The hip and knee are also without fractures.  She was diagnosed with acute toxic encephalopathy with group B strep UTI and was started on ceftriaxone.  She will finish up her Omnicef on August 10, 2019.  She does have a history of paroxysmal atrial fibrillation and is without anticoagulation.  She was in sinus rhythm on admission.  The chads score is at least 4.  Has a history of GI bleed.  Regarding the right sided chest wall pain the x-rays were negative pain did slowly improve.  The fall was mechanical presumed to just be vertigo-like possible of vestibular rehab in the future.  She also has a history of dyspepsia did have one episode of dyspepsia was started on Pepcid with improvement.  Regarding her EKG she did have inverted T waves troponins were negative.  .  She has not experienced any significant pain occasional take Tylenol.  No meclizine as needed.  Level for diet thin liquids.  Getting a number of multivitamins and  minerals.  She has been normotensive afebrile and also on room air.  Very delightful female.  Good sense of humor.  At this point she has successfully participated with the rehabilitation services.  Review of Systems  Currently she denies any chills or fever coughing wheezing chest pain dizziness or vertigo nausea vomiting diarrhea dysuria headache stiff neck or swollen glands.  History of A. fib peripheral neuropathy hypothyroidism  There were no vitals filed for this visit.  Blood pressure 116/68 pulse 73 temperature 97.6 she has been able to participate with the rehabilitation services and actually has not experienced any vertigo syncope or other weakness  Physical Exam   Constitutional: No distress.   HENT:   Cardiovascular:   Occasional irregularity.  No edema.   Pulmonary/Chest: Breath sounds normal.   Abdominal: There is no tenderness.   Musculoskeletal:    Ambulating the hallway with her front wheel walker.  Improved with her strength balance and conditioning.  Psychiatric: Her behavior is normal.    Lab Results   Component Value Date    WBC 9.4 08/04/2019    HGB 11.8 (L) 08/07/2019    HCT 35.8 08/04/2019    MCV 91 08/04/2019     08/07/2019     Results for orders placed or performed during the hospital encounter of 08/03/19   Basic Metabolic Panel   Result Value Ref Range    Sodium 136 136 - 145 mmol/L    Potassium 3.8 3.5 - 5.0 mmol/L    Chloride 105 98 - 107 mmol/L    CO2 23 22 - 31 mmol/L    Anion Gap, Calculation 8 5 - 18 mmol/L    Glucose 85 70 - 125 mg/dL    Calcium 9.4 8.5 - 10.5 mg/dL    BUN 18 8 - 28 mg/dL    Creatinine 0.72 0.60 - 1.10 mg/dL    GFR MDRD Af Amer >60 >60 mL/min/1.73m2    GFR MDRD Non Af Amer >60 >60 mL/min/1.73m2       Lab Results   Component Value Date    TSH 1.46 08/09/2019       MEDICATION LIST:  Current Outpatient Medications   Medication Sig   ? acetaminophen (TYLENOL) 500 MG tablet Take 500-1,000 mg by mouth every 6 (six) hours as needed for pain.   ? aspirin 81 MG EC  tablet Take 1 tablet (81 mg total) by mouth daily.   ? calcium carbonate-vitamin D2 500 mg(1,250mg) -200 unit tablet Take 1 tablet by mouth daily.   ? cholecalciferol, vitamin D3, 1,000 unit tablet Take 1,000 Units by mouth daily.   ? cyanocobalamin (VITAMIN B-12) 500 MCG tablet Take 500 mcg by mouth daily.          ? famotidine (PEPCID) 20 MG tablet Take 1 tablet (20 mg total) by mouth daily.   ? levothyroxine (SYNTHROID, LEVOTHROID) 100 MCG tablet Take 100 mcg by mouth daily.   ? meclizine (ANTIVERT) 12.5 mg tablet Take 1 tablet (12.5 mg total) by mouth 3 (three) times a day as needed.   ? polyethylene glycol (MIRALAX) 17 gram packet Take 1 packet (17 g total) by mouth daily as needed (constipation).       DISCHARGE DIAGNOSIS:    ICD-10-CM    1. Fall, subsequent encounter W19.XXXD    2. Hypothyroidism, unspecified type E03.9    3. Ulcerative colitis with complication, unspecified location (H) K51.919    4. Peripheral Neuropathy G60.9        MEDICAL EQUIPMENT NEEDS:  None    DISCHARGE PLAN/FACE TO FACE:  I certify that services are/were furnished while this patient was under the care of a physician and that a physician or an allowed non-physician practitioner (NPP), had a face-to-face encounter that meets the physician face-to-face encounter requirements. The encounter was in whole, or in part, related to the primary reason for home health. The patient is confined to his/her home and needs intermittent skilled nursing, physical therapy, speech-language pathology, or the continued need for occupational therapy. A plan of care has been established by a physician and is periodically reviewed by a physician.  Date of Face-to-Face Encounter: August 21, 2019    I certify that, based on my findings, the following services are medically necessary home health services: She will be discharging to home with an anticipated discharge date of August 22, 2019 with current medications he will also receive physical and occupational  therapy and nursing.  The home.  She has not yet been identified.    My clinical findings support the need for the above skilled services because: (Please write a brief narrative summary that describes what the RN, PT, SLP, or other services will be doing in the home. A list of diagnoses in this section does not meet the CMS requirements.)  She will require the skilled services secondary to her history of mechanical fall along with home safety transfers medication management and self-care deficits.    This patient is homebound because: (Please write a brief narrative summary describing the functional limitations as to why this patient is homebound and specifically what makes this patient homebound.)  Secondary to her chronic medical conditions including history of mechanical fall along with home safety transfers various exercises and medication management.    The patient is, or has been, under my care and I have initiated the establishment of the plan of care. This patient will be followed by a physician who will periodically review the plan of care.    Schedule follow up visit with primary care provider within 7 days to reestablish care.  She will follow-up with her primary care doctor regarding medication management and any future laboratory studies.  Her UTI has been asymptomatic she did finish her Omnicef on August 10, 2019.  Her appetite is good.  She also did order up with a Medi alert button.  Otherwise she did not have any other questions or concerns as we go through the progress as well as the sequence of her stay on the transitional care unit.  She did not have any other further questions.    Discharge coordination care greater than 30 minutes.  Electronically signed by: Michael Duane Johnson, CNP

## 2021-06-19 NOTE — LETTER
Letter by Johnson, Michael Duane, CNP at      Author: Johnson, Michael Duane, CNP Service: -- Author Type: --    Filed:  Encounter Date: 8/14/2019 Status: (Other)         Patient: Glory Mason   MR Number: [id number]   YOB: 1924   Date of Visit: 8/14/2019     [facility]    Facility:   [facility] [[id number]]   Code Status: DNR      CHIEF COMPLAINT/REASON FOR VISIT:  Chief Complaint   Patient presents with   ? Follow Up     [email or web address]       HISTORY:      HPI: Glory is a 89 y.o. female who I had the chance and pleasure to revisit with secondary to her hospitalization August 3 through August 7, 2019 secondary to mechanical fall.  The work-up was unremarkable except for UTI and did finish up her Cefdinir on August 10.  Asymptomatic.  Afebrile normotensive and also on room air.  Still does not have room independence.  Working out with therapy making pretty good progress.  Looks like eventually she will need a medical alert button to use.  I did talk with her daughter the other day and they are planning on bringing her back to the daughter's home.  Her appetite is good not having any rib pain.  No bowel or bladder issues.  She does take Pepcid for GERD and pyrosis.  Bowels are regular.  Very pleasant female did not have any other concerns today.    Past Medical History:   Diagnosis Date   ? Acute blood loss anemia 6/22/2014   ? Acute Duodenal Ulcer     Created by Conversion    ? Arthritis     osteoarthritis everywhere   ? Atrial fibrillation (H)     Created by Conversion    ? Breast cancer (H)    ? Hyponatremia 6/22/2014   ? Hypothyroidism     Created by Conversion    ? Nontoxic multinodular goiter     Created by Conversion    ? Peptic Ulcer     Created by Conversion    ? Peripheral Neuropathy     Created by Conversion    ? Thyroid Cancer     Created by Conversion    ? Ulcerative Colitis     Created by Conversion              Family History   Problem Relation Age  of Onset   ? No Medical Problems Mother    ? No Medical Problems Father      Social History     Socioeconomic History   ? Marital status:      Spouse name: Not on file   ? Number of children: Not on file   ? Years of education: Not on file   ? Highest education level: Not on file   Occupational History   ? Not on file   Social Needs   ? Financial resource strain: Not on file   ? Food insecurity:     Worry: Not on file     Inability: Not on file   ? Transportation needs:     Medical: Not on file     Non-medical: Not on file   Tobacco Use   ? Smoking status: Never Smoker   ? Smokeless tobacco: Never Used   Substance and Sexual Activity   ? Alcohol use: Yes     Alcohol/week: 0.0 oz     Comment: 1/month   ? Drug use: No   ? Sexual activity: Not Currently   Lifestyle   ? Physical activity:     Days per week: Not on file     Minutes per session: Not on file   ? Stress: Not on file   Relationships   ? Social connections:     Talks on phone: Not on file     Gets together: Not on file     Attends Anabaptist service: Not on file     Active member of club or organization: Not on file     Attends meetings of clubs or organizations: Not on file     Relationship status: Not on file   ? Intimate partner violence:     Fear of current or ex partner: Not on file     Emotionally abused: Not on file     Physically abused: Not on file     Forced sexual activity: Not on file   Other Topics Concern   ? Not on file   Social History Narrative   ? Not on file         Review of Systems  Currently she denies any chills or fever coughing wheezing chest pain dizziness or vertigo nausea vomiting diarrhea dysuria headache stiff neck or swollen glands.  History of A. fib peripheral neuropathy hypothyroidism    Current Outpatient Medications   Medication Sig Note   ? acetaminophen (TYLENOL) 500 MG tablet Take 500-1,000 mg by mouth every 6 (six) hours as needed for pain.    ? aspirin 81 MG EC tablet Take 1 tablet (81 mg total) by mouth daily.     ? calcium carbonate-vitamin D2 500 mg(1,250mg) -200 unit tablet Take 1 tablet by mouth daily.    ? cholecalciferol, vitamin D3, 1,000 unit tablet Take 1,000 Units by mouth daily.    ? cyanocobalamin (VITAMIN B-12) 500 MCG tablet Take 500 mcg by mouth daily.        7/7/2016: .   ? famotidine (PEPCID) 20 MG tablet Take 1 tablet (20 mg total) by mouth daily.    ? levothyroxine (SYNTHROID, LEVOTHROID) 100 MCG tablet Take 100 mcg by mouth daily.    ? meclizine (ANTIVERT) 12.5 mg tablet Take 1 tablet (12.5 mg total) by mouth 3 (three) times a day as needed.    ? polyethylene glycol (MIRALAX) 17 gram packet Take 1 packet (17 g total) by mouth daily as needed (constipation).        .There were no vitals filed for this visit.  Blood pressure 122/63 pulse 72 temperature 98.8 saturation room air 96%  Physical Exam  Constitutional: No distress.   HENT:   Neck: Neck supple. No thyromegaly present.   Cardiovascular:   Occasional irregularity.  No edema.   Pulmonary/Chest: Breath sounds normal.   Abdominal: Bowel sounds are normal. There is no tenderness. There is no guarding.   Musculoskeletal:   Denies rib pain.  Generalized weakness.   Skin: Skin is warm and dry. No rash noted.   Psychiatric: Her behavior is normal.   LABS:   Lab Results   Component Value Date    WBC 9.4 08/04/2019    HGB 11.8 (L) 08/07/2019    HCT 35.8 08/04/2019    MCV 91 08/04/2019     08/07/2019     Lab Results   Component Value Date    TSH 1.46 08/09/2019         ASSESSMENT:      ICD-10-CM    1. Fall, subsequent encounter W19.XXXD    2. Gait instability R26.81    3. Physical debility R53.81    4. Pyrosis R12        PLAN:    She does have some cognitive impairment otherwise very pleasant female.  Had a good conversation today she is aware that she does need to put a call light on and get assistance for room ambulation as well as any type of care.  Looks like there is a potential for discharge within the near future otherwise the family is very  involved with her care.  She will probably need a medical alert button.        Electronically signed by: Michael Duane Johnson, CNP

## 2021-06-19 NOTE — LETTER
Letter by Javier Golden DO at      Author: Javier Golden DO Service: -- Author Type: --    Filed:  Encounter Date: 8/8/2019 Status: (Other)         Patient: Glory Mason   MR Number: [id number]   YOB: 1924   Date of Visit: 8/8/2019     [facility]      Facility:    [facility] [[id number]]  Code Status: DNR      Chief Complaint/Reason for Visit:  Chief Complaint   Patient presents with   ? H & P     Acute encephalopathy, urinary tract infection, atrial fibrillation, peripheral neuropathy, hypothyroidism, a fall, hypothyroidism.       HPI:   Glory is a 89 y.o. female who was recently admitted to the hospital on 8/3/2019.  Her daughter did come to her house in the day of admission and found her on the floor.  It is unclear how she got on the floor but she was then brought to the hospital.  She does have a history of atrial fibrillation which is chronic and currently on aspirin.  She is not on any anticoagulation she does have peptic ulcer disease as well as hypothyroidism.  Is unclear how long she was on the ground.  And she did imaging in the emergency room that showed no fractures.  This included ribs knees.  Head CT was basically negative for any intracranial hemorrhage or acute pathology.  She was then admitted to the hospital was found to have acute toxic encephalopathy and she did grow group B strep UTI.  She was treated with ceftriaxone and her encephalopathy did wax and wane.  On the day of discharge it did clear.  She did have that does have paroxysmal atrial fibrillation and the no anticoagulation secondary to risk of falls and she is not a candidate at this time.  Her overall condition in the hospital did improve and she was treated appropriately and transferred here to the TCU at [facility] in stable condition.    Patient is actually feels she is back to baseline at this time with further investigation I did ask her she  remembered a fall and she says she does remember it and she just went clunk.  She does deny remembering any dizziness syncope near syncope or feeling faint.  And is still unclear how long she was down on the ground.    She is no real issues today at this time and does remember who her daughter was in did remember my name.  That was pretty remarkable.  She may have an element of dementia and she denies any other issues at this time.    Past Medical History:  Past Medical History:   Diagnosis Date   ? Acute blood loss anemia 6/22/2014   ? Acute Duodenal Ulcer     Created by Conversion    ? Arthritis     osteoarthritis everywhere   ? Atrial fibrillation (H)     Created by Conversion    ? Breast cancer (H)    ? Hyponatremia 6/22/2014   ? Hypothyroidism     Created by Conversion    ? Nontoxic multinodular goiter     Created by Conversion    ? Peptic Ulcer     Created by Conversion    ? Peripheral Neuropathy     Created by Conversion    ? Thyroid Cancer     Created by Conversion    ? Ulcerative Colitis     Created by Conversion            Surgical History:  Past Surgical History:   Procedure Laterality Date   ? ABDOMINAL SURGERY      perforated ulcer   ? BREAST SURGERY     ? EYE SURGERY      cataracts   ? FRACTURE SURGERY     ? MASTECTOMY      Right side    ? FL MASTECTOMY, SIMPLE, COMPLETE      Description: Simple Mastectomy Right Breast;  Recorded: 10/30/2009;   ? FL TREAT INTER/SUBTROCH FX,W/PLATE/SCREW      Description: Open Treatment Of An Intertrochanteric Fracture;  Recorded: 07/24/2014;   ? SKIN BIOPSY     ? THYROID SURGERY      malignant nodule removed        Family History:   Family History   Problem Relation Age of Onset   ? No Medical Problems Mother    ? No Medical Problems Father        Social History:    Social History     Socioeconomic History   ? Marital status:      Spouse name: None   ? Number of children: None   ? Years of education: None   ? Highest education level: None   Occupational History    ? None   Social Needs   ? Financial resource strain: None   ? Food insecurity:     Worry: None     Inability: None   ? Transportation needs:     Medical: None     Non-medical: None   Tobacco Use   ? Smoking status: Never Smoker   ? Smokeless tobacco: Never Used   Substance and Sexual Activity   ? Alcohol use: Yes     Alcohol/week: 0.0 oz     Comment: 1/month   ? Drug use: No   ? Sexual activity: Not Currently   Lifestyle   ? Physical activity:     Days per week: None     Minutes per session: None   ? Stress: None   Relationships   ? Social connections:     Talks on phone: None     Gets together: None     Attends Mu-[employer] service: None     Active member of club or organization: None     Attends meetings of clubs or organizations: None     Relationship status: None   ? Intimate partner violence:     Fear of current or ex partner: None     Emotionally abused: None     Physically abused: None     Forced sexual activity: None   Other Topics Concern   ? None   Social History Narrative   ? None          Review of Systems   Constitutional:        Positive for right rib pain but is very mild and well-controlled.  She denies any fevers chills nausea vomiting diarrhea change in vision hearing taste or smell weakness one side the other chest pain or shortness of breath.  She denies any congeners stool polyphagia polydipsia polyuria depression or anxiety and she denies any urgency frequency or burning with urination.  The remainder the review of systems is negative.       Vitals:    08/08/19 0810   BP: 150/89   Pulse: 76   Resp: 18   Temp: 97.6  F (36.4  C)   SpO2: 96%       Physical Exam   Constitutional: No distress.   HENT:   Nose: Nose normal.   Mouth/Throat: No oropharyngeal exudate.   Small bruising ecchymosis on the top of her forehead.  Otherwise atraumatic and normocephalic.   Eyes: Conjunctivae are normal. Right eye exhibits no discharge. Left eye exhibits no discharge.   Neck: Neck supple. No thyromegaly present.    Cardiovascular:   Patient's heart sounds are irregularly irregular with adequate rate control.   Pulmonary/Chest: Effort normal and breath sounds normal. No respiratory distress. She exhibits no tenderness.   Abdominal: Bowel sounds are normal. She exhibits no distension. There is no tenderness. There is no rebound.   Musculoskeletal: She exhibits no edema.   Patient is tender over the right rib lower rib cage but no abdominal tenderness.  There is no tenderness in the lower extremities or upper extremities bilateral.   Neurological:   Patient's cranial nerves II through XII are intact there is no pronator drift.  Finger-to-nose did not show any past-pointing and strength and reflexes were symmetrical bilateral.   Skin: Skin is warm and dry. She is not diaphoretic.   Psychiatric: She has a normal mood and affect. Her behavior is normal.       Medication List:  Current Outpatient Medications   Medication Sig   ? acetaminophen (TYLENOL) 500 MG tablet Take 500-1,000 mg by mouth every 6 (six) hours as needed for pain.   ? aspirin 81 MG EC tablet Take 1 tablet (81 mg total) by mouth daily.   ? calcium carbonate-vitamin D2 500 mg(1,250mg) -200 unit tablet Take 1 tablet by mouth daily.   ? cefdinir (OMNICEF) 300 MG capsule Take 1 capsule (300 mg total) by mouth Daily at 6:00 am for 3 days.   ? cholecalciferol, vitamin D3, 1,000 unit tablet Take 1,000 Units by mouth daily.   ? cyanocobalamin (VITAMIN B-12) 500 MCG tablet Take 500 mcg by mouth daily.          ? famotidine (PEPCID) 20 MG tablet Take 1 tablet (20 mg total) by mouth daily.   ? levothyroxine (SYNTHROID, LEVOTHROID) 100 MCG tablet Take 100 mcg by mouth daily.   ? meclizine (ANTIVERT) 12.5 mg tablet Take 1 tablet (12.5 mg total) by mouth 3 (three) times a day as needed.   ? polyethylene glycol (MIRALAX) 17 gram packet Take 1 packet (17 g total) by mouth daily as needed (constipation).       Labs: Hospital laboratory values are as follows and 7 7 hemoglobin is  9.9, platelets 193,000.  On admission to the hospital white count was 13.9 and troponin 0 0.15 without any signs of acute coronary syndrome.  ASIC metabolic profile was within normal limits and urine culture grew out greater than 100 group B streptococcus.      Assessment:    ICD-10-CM    1. Fall, subsequent encounter W19.XXXD    2. Acute encephalopathy G93.40    3. Acute cystitis without hematuria N30.00    4. Hypothyroidism, unspecified type E03.9    5. 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  Her acute encephalopathy seems to have resolved at this time and we will evaluate patient for cognitive deficits.  She is having no signs of acute cystitis at this time so no further work-up noted and she will complete her antibiotics.  As far as her hypothyroid is concerned she seems euthyroid by exam I do not have her last TSH.  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  I will continue to monitor above medical problems and no other changes to care plan at this time.        Electronically signed by: Javier Golden DO

## 2021-06-19 NOTE — LETTER
Letter by Johnson, Michael Duane, CNP at      Author: Johnson, Michael Duane, CNP Service: -- Author Type: --    Filed:  Encounter Date: 8/9/2019 Status: (Other)         Patient: Glory Mason   MR Number: [id number]   YOB: 1924   Date of Visit: 8/9/2019     [facility]    Facility:   [facility] [[id number]]   Code Status: DNR      CHIEF COMPLAINT/REASON FOR VISIT:  Chief Complaint   Patient presents with   ? Follow Up     rehab, [facility] fall       HISTORY:      HPI: Glory is a 89 y.o. female who I had the chance to visit with secondary to her hospitalization August 3 through August 7, 2019 secondary to mechanical fall.  During her work-up she did have a CT of the head without contrast which did not show any intracranial hemorrhage masses or effects.  No acute infarct although does have unchanged moderate diffuse parenchymal volume loss.  The right rib x-rays did not show any fractures.  The hip and knee are also without fractures.  She was diagnosed with acute toxic encephalopathy with group B strep UTI and was started on ceftriaxone.  She will finish up her Omnicef on August 10, 2019.  She does have a history of paroxysmal atrial fibrillation and is without anticoagulation.  She was in sinus rhythm on admission.  The chads score is at least 4.  Has a history of GI bleed.  Regarding the right sided chest wall pain the x-rays were negative pain did slowly improve.  The fall was mechanical presumed to just be vertigo-like possible of vestibular rehab in the future.  She also has a history of dyspepsia did have one episode of dyspepsia was started on Pepcid with improvement.  Regarding her EKG she did have inverted T waves troponins were negative.  She currently is in the transitional care unit.  Had a pleasant visit today overall she is really not having any significant rib pain.  She states her appetite is improving no bowel or bladder problems.  We did talk  about her current living situation as well as she grew up in [place] went to [employer].  She has been normotensive and afebrile.  Not receiving any meclizine Tylenol or MiraLAX.  She will finish up her Omnicef on August 10.  She still is on Pepcid and is asymptomatic.  She is on a couple multivitamins and minerals.  She does have hypothyroidism there is a TSH ordered today and currently is on Synthroid 100 mcg.    Past Medical History:   Diagnosis Date   ? Acute blood loss anemia 6/22/2014   ? Acute Duodenal Ulcer     Created by Conversion    ? Arthritis     osteoarthritis everywhere   ? Atrial fibrillation (H)     Created by Conversion    ? Breast cancer (H)    ? Hyponatremia 6/22/2014   ? Hypothyroidism     Created by Conversion    ? Nontoxic multinodular goiter     Created by Conversion    ? Peptic Ulcer     Created by Conversion    ? Peripheral Neuropathy     Created by Conversion    ? Thyroid Cancer     Created by Conversion    ? Ulcerative Colitis     Created by Conversion              Family History   Problem Relation Age of Onset   ? No Medical Problems Mother    ? No Medical Problems Father      Social History     Socioeconomic History   ? Marital status:      Spouse name: Not on file   ? Number of children: Not on file   ? Years of education: Not on file   ? Highest education level: Not on file   Occupational History   ? Not on file   Social Needs   ? Financial resource strain: Not on file   ? Food insecurity:     Worry: Not on file     Inability: Not on file   ? Transportation needs:     Medical: Not on file     Non-medical: Not on file   Tobacco Use   ? Smoking status: Never Smoker   ? Smokeless tobacco: Never Used   Substance and Sexual Activity   ? Alcohol use: Yes     Alcohol/week: 0.0 oz     Comment: 1/month   ? Drug use: No   ? Sexual activity: Not Currently   Lifestyle   ? Physical activity:     Days per week: Not on file     Minutes per session: Not on file   ? Stress: Not  on file   Relationships   ? Social connections:     Talks on phone: Not on file     Gets together: Not on file     Attends L[employer]-day service: Not on file     Active member of club or organization: Not on file     Attends meetings of clubs or organizations: Not on file     Relationship status: Not on file   ? Intimate partner violence:     Fear of current or ex partner: Not on file     Emotionally abused: Not on file     Physically abused: Not on file     Forced sexual activity: Not on file   Other Topics Concern   ? Not on file   Social History Narrative   ? Not on file         Review of Systems  Currently she denies any chills or fever coughing wheezing chest pain dizziness or vertigo nausea vomiting diarrhea dysuria headache stiff neck or swollen glands.  History of A. fib peripheral neuropathy hypothyroidism    Current Outpatient Medications   Medication Sig Note   ? acetaminophen (TYLENOL) 500 MG tablet Take 500-1,000 mg by mouth every 6 (six) hours as needed for pain.    ? aspirin 81 MG EC tablet Take 1 tablet (81 mg total) by mouth daily.    ? calcium carbonate-vitamin D2 500 mg(1,250mg) -200 unit tablet Take 1 tablet by mouth daily.    ? cefdinir (OMNICEF) 300 MG capsule Take 1 capsule (300 mg total) by mouth Daily at 6:00 am for 3 days.    ? cholecalciferol, vitamin D3, 1,000 unit tablet Take 1,000 Units by mouth daily.    ? cyanocobalamin (VITAMIN B-12) 500 MCG tablet Take 500 mcg by mouth daily.        7/7/2016: .   ? famotidine (PEPCID) 20 MG tablet Take 1 tablet (20 mg total) by mouth daily.    ? levothyroxine (SYNTHROID, LEVOTHROID) 100 MCG tablet Take 100 mcg by mouth daily.    ? meclizine (ANTIVERT) 12.5 mg tablet Take 1 tablet (12.5 mg total) by mouth 3 (three) times a day as needed.    ? polyethylene glycol (MIRALAX) 17 gram packet Take 1 packet (17 g total) by mouth daily as needed (constipation).        .There were no vitals filed for this visit.  Blood pressure 115/63 pulse 90 temperature 98.9  saturation room air 95%  Physical Exam   Constitutional: No distress.   HENT:   Head: Normocephalic.   Eyes: Pupils are equal, round, and reactive to light.   Neck: Neck supple. No thyromegaly present.   Cardiovascular:   Occasional irregularity.  No edema.   Pulmonary/Chest: Breath sounds normal.   Abdominal: Bowel sounds are normal. There is no tenderness. There is no guarding.   Musculoskeletal:   Denies rib pain.  Generalized weakness.   Lymphadenopathy:     She has no cervical adenopathy.   Skin: Skin is warm and dry. No rash noted.   Psychiatric: Her behavior is normal.         LABS:   Lab Results   Component Value Date    WBC 9.4 08/04/2019    HGB 11.8 (L) 08/07/2019    HCT 35.8 08/04/2019    MCV 91 08/04/2019     08/07/2019     Results for orders placed or performed during the hospital encounter of 08/03/19   Basic Metabolic Panel   Result Value Ref Range    Sodium 136 136 - 145 mmol/L    Potassium 3.8 3.5 - 5.0 mmol/L    Chloride 105 98 - 107 mmol/L    CO2 23 22 - 31 mmol/L    Anion Gap, Calculation 8 5 - 18 mmol/L    Glucose 85 70 - 125 mg/dL    Calcium 9.4 8.5 - 10.5 mg/dL    BUN 18 8 - 28 mg/dL    Creatinine 0.72 0.60 - 1.10 mg/dL    GFR MDRD Af Amer >60 >60 mL/min/1.73m2    GFR MDRD Non Af Amer >60 >60 mL/min/1.73m2       Lab Results   Component Value Date    ALBUMIN 4.1 03/11/2019     Lab Results   Component Value Date    ALT 15 03/11/2019    AST 15 03/11/2019    ALKPHOS 67 03/11/2019    BILITOT 0.6 03/11/2019     Lab Results   Component Value Date    TSH 0.23 (L) 03/11/2019         ASSESSMENT:      ICD-10-CM    1. Fall, subsequent encounter W19.XXXD    2. Paroxysmal atrial fibrillation (H) I48.0    3. Peripheral Neuropathy G60.9    4. Physical debility R53.81        PLAN:    There is a TSH ordered for today the results not yet back by this dictation see results above her most recent TSH.  Continue to manage and follow her other chronic medical conditions.  She will finish up the Omnicef on  August 10.      Electronically signed by: Michael Duane Johnson, CNP

## 2021-06-19 NOTE — LETTER
Letter by Johnson, Michael Duane, CNP at      Author: Johnson, Michael Duane, CNP Service: -- Author Type: --    Filed:  Encounter Date: 8/19/2019 Status: (Other)         Patient: Glory Mason   MR Number: [id number]   YOB: 1924   Date of Visit: 8/19/2019     [facility]    Facility:   [facility] [[id number]]   Code Status: DNR      CHIEF COMPLAINT/REASON FOR VISIT:  Chief Complaint   Patient presents with   ? Follow Up     rehab, [facility] fall       HISTORY:      HPI: Glory is a 89 y.o. female who I had the pleasure to revisit with secondary to her hospitalization August 3 through August 7 2019 seconds mechanical fall.  She will finish up her antibiotics on August 10 secondary to UTI otherwise currently asymptomatic.  She does feel like she is gained a lot of room independence.  Denies any dizziness vertigo syncope.  Appetite is improving.  She is open to be able to go home soon.  Denies any pain and did take a Tylenol the 17th prior to that August 14.  No dizziness no meclizine as needed.  No bowel or bladder issues.  Pepcid is for dyspepsia.  Is on Synthroid 100 mcg.  As we go over the progress of her rehabilitation as well as her medications she does not have any other questions or other concerns at this time.  Very pleasant female.    Past Medical History:   Diagnosis Date   ? Acute blood loss anemia 6/22/2014   ? Acute Duodenal Ulcer     Created by Conversion    ? Arthritis     osteoarthritis everywhere   ? Atrial fibrillation (H)     Created by Conversion    ? Breast cancer (H)    ? Hyponatremia 6/22/2014   ? Hypothyroidism     Created by Conversion    ? Nontoxic multinodular goiter     Created by Conversion    ? Peptic Ulcer     Created by Conversion    ? Peripheral Neuropathy     Created by Conversion    ? Thyroid Cancer     Created by Conversion    ? Ulcerative Colitis     Created by Conversion              Family History   Problem Relation Age of Onset    ? No Medical Problems Mother    ? No Medical Problems Father      Social History     Socioeconomic History   ? Marital status:      Spouse name: Not on file   ? Number of children: Not on file   ? Years of education: Not on file   ? Highest education level: Not on file   Occupational History   ? Not on file   Social Needs   ? Financial resource strain: Not on file   ? Food insecurity:     Worry: Not on file     Inability: Not on file   ? Transportation needs:     Medical: Not on file     Non-medical: Not on file   Tobacco Use   ? Smoking status: Never Smoker   ? Smokeless tobacco: Never Used   Substance and Sexual Activity   ? Alcohol use: Yes     Alcohol/week: 0.0 oz     Comment: 1/month   ? Drug use: No   ? Sexual activity: Not Currently   Lifestyle   ? Physical activity:     Days per week: Not on file     Minutes per session: Not on file   ? Stress: Not on file   Relationships   ? Social connections:     Talks on phone: Not on file     Gets together: Not on file     Attends [employer] service: Not on file     Active member of club or organization: Not on file     Attends meetings of clubs or organizations: Not on file     Relationship status: Not on file   ? Intimate partner violence:     Fear of current or ex partner: Not on file     Emotionally abused: Not on file     Physically abused: Not on file     Forced sexual activity: Not on file   Other Topics Concern   ? Not on file   Social History Narrative   ? Not on file         Review of Systems  Currently she denies any chills or fever coughing wheezing chest pain dizziness or vertigo nausea vomiting diarrhea dysuria headache stiff neck or swollen glands.  History of A. fib peripheral neuropathy hypothyroidism    Current Outpatient Medications   Medication Sig Note   ? acetaminophen (TYLENOL) 500 MG tablet Take 500-1,000 mg by mouth every 6 (six) hours as needed for pain.    ? aspirin 81 MG EC tablet Take 1 tablet (81 mg total) by mouth daily.    ?  calcium carbonate-vitamin D2 500 mg(1,250mg) -200 unit tablet Take 1 tablet by mouth daily.    ? cholecalciferol, vitamin D3, 1,000 unit tablet Take 1,000 Units by mouth daily.    ? cyanocobalamin (VITAMIN B-12) 500 MCG tablet Take 500 mcg by mouth daily.        7/7/2016: .   ? famotidine (PEPCID) 20 MG tablet Take 1 tablet (20 mg total) by mouth daily.    ? levothyroxine (SYNTHROID, LEVOTHROID) 100 MCG tablet Take 100 mcg by mouth daily.    ? meclizine (ANTIVERT) 12.5 mg tablet Take 1 tablet (12.5 mg total) by mouth 3 (three) times a day as needed.    ? polyethylene glycol (MIRALAX) 17 gram packet Take 1 packet (17 g total) by mouth daily as needed (constipation).        .There were no vitals filed for this visit.  Blood pressure 117/75 pulse 60 respirations 16 temperature 97.2 saturation room air 94%  Physical Exam   Constitutional: No distress.   HENT:   Cardiovascular:   Occasional irregularity.  No edema.   Pulmonary/Chest: Breath sounds normal.   Abdominal: Bowel sounds are normal. There is no tenderness. There is no guarding.   Musculoskeletal:   Denies rib pain.  Generalized weakness.   Skin: Skin is warm and dry. No rash noted.   Psychiatric: Her behavior is normal.    LABS:   Lab Results   Component Value Date    WBC 9.4 08/04/2019    HGB 11.8 (L) 08/07/2019    HCT 35.8 08/04/2019    MCV 91 08/04/2019     08/07/2019     Results for orders placed or performed during the hospital encounter of 08/03/19   Basic Metabolic Panel   Result Value Ref Range    Sodium 136 136 - 145 mmol/L    Potassium 3.8 3.5 - 5.0 mmol/L    Chloride 105 98 - 107 mmol/L    CO2 23 22 - 31 mmol/L    Anion Gap, Calculation 8 5 - 18 mmol/L    Glucose 85 70 - 125 mg/dL    Calcium 9.4 8.5 - 10.5 mg/dL    BUN 18 8 - 28 mg/dL    Creatinine 0.72 0.60 - 1.10 mg/dL    GFR MDRD Af Amer >60 >60 mL/min/1.73m2    GFR MDRD Non Af Amer >60 >60 mL/min/1.73m2       Lab Results   Component Value Date    TSH 1.46 08/09/2019     Lab Results    Component Value Date    ALBUMIN 4.1 03/11/2019         ASSESSMENT:      ICD-10-CM    1. Physical debility R53.81    2. Dyspepsia R10.13    3. Hypertension, unspecified type I10    4. Peripheral Neuropathy G60.9        PLAN:    No current changes are necessary.  Medications are doing well.  She is moving about well.  Denies any pain or other issues.  She is open to be able to go home soon.  I am not aware of any recent care conference.      Electronically signed by: Michael Duane Johnson, CNP

## 2021-06-19 NOTE — LETTER
Letter by Johnson, Michael Duane, CNP at      Author: Johnson, Michael Duane, CNP Service: -- Author Type: --    Filed:  Encounter Date: 8/12/2019 Status: (Other)         Patient: Glory Mason   MR Number: [id number]   YOB: 1924   Date of Visit: 8/12/2019     [facility]    Facility:   [facility] [[id number]]   Code Status: DNR      CHIEF COMPLAINT/REASON FOR VISIT:  Chief Complaint   Patient presents with   ? Follow Up     rehab, [facility] fall       HISTORY:      HPI: Glory is a 89 y.o. female who I had the pleasure to revisit with secondary to her hospitalization August 3 through August 7 2019-second mechanical fall.  She still does have some rib pain secondary to the fall really at this point it is decreased she is feeling pretty good overall.  Very delightful female has been a chance to talk about her chronic medical conditions as well as her work-up in the hospital as well as trying to work out in therapy.  She does receive about one Tylenol per day as needed for pain she did finish her Omnicef on August 10 secondary to her urinary tract infection.  No heartburn or reflux is on Pepcid.  Had a chance to talk about again the rehabilitation process as well as trying to have a discharge from here she is feeling pretty close but still not independent enough to be able to be discharged.    Past Medical History:   Diagnosis Date   ? Acute blood loss anemia 6/22/2014   ? Acute Duodenal Ulcer     Created by Conversion    ? Arthritis     osteoarthritis everywhere   ? Atrial fibrillation (H)     Created by Conversion    ? Breast cancer (H)    ? Hyponatremia 6/22/2014   ? Hypothyroidism     Created by Conversion    ? Nontoxic multinodular goiter     Created by Conversion    ? Peptic Ulcer     Created by Conversion    ? Peripheral Neuropathy     Created by Conversion    ? Thyroid Cancer     Created by Conversion    ? Ulcerative Colitis     Created by Conversion               Family History   Problem Relation Age of Onset   ? No Medical Problems Mother    ? No Medical Problems Father      Social History     Socioeconomic History   ? Marital status:      Spouse name: Not on file   ? Number of children: Not on file   ? Years of education: Not on file   ? Highest education level: Not on file   Occupational History   ? Not on file   Social Needs   ? Financial resource strain: Not on file   ? Food insecurity:     Worry: Not on file     Inability: Not on file   ? Transportation needs:     Medical: Not on file     Non-medical: Not on file   Tobacco Use   ? Smoking status: Never Smoker   ? Smokeless tobacco: Never Used   Substance and Sexual Activity   ? Alcohol use: Yes     Alcohol/week: 0.0 oz     Comment: 1/month   ? Drug use: No   ? Sexual activity: Not Currently   Lifestyle   ? Physical activity:     Days per week: Not on file     Minutes per session: Not on file   ? Stress: Not on file   Relationships   ? Social connections:     Talks on phone: Not on file     Gets together: Not on file     Attends [employer] service: Not on file     Active member of club or organization: Not on file     Attends meetings of clubs or organizations: Not on file     Relationship status: Not on file   ? Intimate partner violence:     Fear of current or ex partner: Not on file     Emotionally abused: Not on file     Physically abused: Not on file     Forced sexual activity: Not on file   Other Topics Concern   ? Not on file   Social History Narrative   ? Not on file         Review of Systems  Currently she denies any chills or fever coughing wheezing chest pain dizziness or vertigo nausea vomiting diarrhea dysuria headache stiff neck or swollen glands.  History of A. fib peripheral neuropathy hypothyroidism           Current Outpatient Medications   Medication Sig Note   ? acetaminophen (TYLENOL) 500 MG tablet Take 500-1,000 mg by mouth every 6 (six) hours as needed for pain.     ? aspirin 81 MG EC  tablet Take 1 tablet (81 mg total) by mouth daily.     ? calcium carbonate-vitamin D2 500 mg(1,250mg) -200 unit tablet Take 1 tablet by mouth daily.     ? cefdinir (OMNICEF) 300 MG capsule Take 1 capsule (300 mg total) by mouth Daily at 6:00 am for 3 days.     ? cholecalciferol, vitamin D3, 1,000 unit tablet Take 1,000 Units by mouth daily.     ? cyanocobalamin (VITAMIN B-12) 500 MCG tablet Take 500 mcg by mouth daily.           7/7/2016: .   ? famotidine (PEPCID) 20 MG tablet Take 1 tablet (20 mg total) by mouth daily.     ? levothyroxine (SYNTHROID, LEVOTHROID) 100 MCG tablet Take 100 mcg by mouth daily.     ? meclizine (ANTIVERT) 12.5 mg tablet Take 1 tablet (12.5 mg total) by mouth 3 (three) times a day as needed.     ? polyethylene glycol (MIRALAX) 17 gram packet Take 1 packet (17 g total) by mouth daily as needed (constipation).        .There were no vitals filed for this visit.  Blood pressure 158/81 pulse 62 respirations 20 temperature 98.2 saturation room air 95%.  Systolic blood pressures ranging 103-158  Physical Exam  Constitutional: No distress.   HENT:   Eyes: Pupils are equal, round, and reactive to light.   Neck: Neck supple. No thyromegaly present.   Cardiovascular:   Occasional irregularity.  No edema.   Pulmonary/Chest: Breath sounds normal.   Abdominal: Bowel sounds are normal. There is no tenderness. There is no guarding.   Musculoskeletal:   Denies rib pain.  Generalized weakness.   Skin: Skin is warm and dry. No rash noted.   Psychiatric: Her behavior is normal.   LABS:   Lab Results   Component Value Date    WBC 9.4 08/04/2019    HGB 11.8 (L) 08/07/2019    HCT 35.8 08/04/2019    MCV 91 08/04/2019     08/07/2019     Lab Results   Component Value Date    TSH 1.46 08/09/2019         ASSESSMENT:      ICD-10-CM    1. Fall, subsequent encounter W19.XXXD    2. Rib pain R07.81    3. Peripheral Neuropathy G60.9    4. Physical debility R53.81        PLAN:    At this time continue to manage and  follow her chronic medical conditions as well as history of falls.  Did finish up the Omnicef no current UTI symptoms.  As we go through her progress as well as the progression of her therapy at this time she is feeling pretty good overall but does not feel strong enough to be able to be independent.  Continue to manage and follow      Electronically signed by: Michael Duane Johnson, CNP

## 2021-06-24 NOTE — TELEPHONE ENCOUNTER
Spoke with patient and she will contact her daughter who schedules appointments to call us back to [facility] physical.

## 2021-06-24 NOTE — TELEPHONE ENCOUNTER
Former patient of Mumtaz & has not established care with another provider.  Please assign refill request to covering provider per Clinic standard process.      RN cannot approve Refill Request    RN can NOT refill this medication PCP messaged that patient is overdue for Labs and Office Visit.       Jocelyne Mcconnell, Care Connection Triage/Med Refill 2/13/2019    Requested Prescriptions   Pending Prescriptions Disp Refills     levothyroxine (SYNTHROID, LEVOTHROID) 100 MCG tablet [Pharmacy Med Name: LEVOTHYROXINE 0.100MG (100MCG) TAB] 90 tablet 3     Sig: TAKE 1 TABLET(100 MCG) BY MOUTH DAILY    Thyroid Hormones Protocol Failed - 2/11/2019  1:31 PM       Failed - Provider visit in past 12 months or next 3 months    Last office visit with prescriber/PCP: 2/6/2018 Lucero Pandya DO OR same dept: Visit date not found OR same specialty: 2/6/2018 Lucero Pandya DO  Last physical: Visit date not found Last MTM visit: Visit date not found   Next visit within 3 mo: Visit date not found  Next physical within 3 mo: Visit date not found  Prescriber OR PCP: Lucero Pandya DO  Last diagnosis associated with med order: 1. Thyroid Cancer  - levothyroxine (SYNTHROID, LEVOTHROID) 100 MCG tablet [Pharmacy Med Name: LEVOTHYROXINE 0.100MG (100MCG) TAB]; TAKE 1 TABLET(100 MCG) BY MOUTH DAILY  Dispense: 90 tablet; Refill: 0    If protocol passes may refill for 12 months if within 3 months of last provider visit (or a total of 15 months).            Failed - TSH on file in past 12 months for patient age 12 & older    TSH   Date Value Ref Range Status   01/10/2017 0.26 (L) 0.30 - 5.00 uIU/mL Final

## 2021-06-24 NOTE — PROGRESS NOTES
Assessment and Plan:   This rather remarkable 89-year-old lady comes in for a wellness examination.  She is feeling well lives by herself she has had some medical issues in the past including chronic atrial fibrillation which is not treated with anticoagulation because patient does not want to take anything.  She had a pyloric ulcer which bled due to aspirin which was being taken for osteoarthritis.  Since that time she is been taking Tylenol.  She was hospitalized for that and had some surgery this is when the atrial fibrillation was discovered.  Patient also broke her hip about 6 years ago this was successfully repaired patient made an uneventful recovery from that.  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  She is at uneventful postoperative course from that.  Patient was placed on Synthroid due to subtotal thyroidectomy and thyroid is been monitored on an annual basis thyroid medication is done been altered or has had a necessity to be altered.  Patient has no visual disturbances or hearing is down a bit balance is okay no dysphasia shortness of breath dyspnea chest pain angina abdominal pain diarrhea constipation urgency frequency dysuria patient lives by herself daughter is across the street patient does her own shopping twice a week and cooks for herself.  We will see her for follow-up 1 year pending test results.    1. Thyroid Cancer  Continue 100 mcg of thyroid  - meclizine (ANTIVERT) 12.5 mg tablet; Take 1 tablet (12.5 mg total) by mouth 3 (three) times a day as needed.  Dispense: 30 tablet; Refill: 0  - levothyroxine (SYNTHROID, LEVOTHROID) 100 MCG tablet; One daily  Dispense: 90 tablet; Refill: 3  - Thyroid Walker    2. Encounter for Medicare annual wellness exam  EKG was normal labs are pending we will recheck her one  year  - Comprehensive Metabolic Panel  - Electrocardiogram Perform - Clinic  - [facility](CBC w/o Differential)  - Urinalysis-UC if Indicated    3. Chronic atrial fibrillation (H)  No treatment at this time no new treatment indicated at this time    The patient's current medical problems were reviewed.    I have had an Advance Directives discussion with the patient.  The following health maintenance schedule was reviewed with the patient and provided in printed form in the after visit summary:   Health Maintenance   Topic Date Due     ZOSTER VACCINES (1 of 2) 08/27/1974     DXA SCAN  08/27/1989     TD 18+ HE  08/01/2013     INFLUENZA VACCINE RULE BASED (1) 03/26/2021 (Originally 8/1/2018)     FALL RISK ASSESSMENT  03/11/2020     ADVANCE DIRECTIVES DISCUSSED WITH PATIENT  03/13/2022     PNEUMOCOCCAL POLYSACCHARIDE VACCINE AGE 65 AND OVER  Addressed     PNEUMOCOCCAL CONJUGATE VACCINE FOR ADULTS (PCV13 OR PREVNAR)  Addressed        Subjective:   Chief Complaint: Glory Mason is an 89 y.o. female here for an Annual Wellness visit.   HPI: See under assessment and plan    Review of Systems: 14 point review of system negative please see above.  The rest of the review of systems are negative for all systems.    Patient Care Team:  Lucero Pandya DO as PCP - General (Family Medicine)     Patient Active Problem List   Diagnosis     Atrial fibrillation (H)     Peripheral Neuropathy     Ulcerative Colitis     Thyroid Cancer     Nontoxic Multinodular Goiter     Hypothyroidism     Joint Pain, Localized In The Knee     Ankle Joint Pain     Hip fracture, intertrochanteric (H)     Hip fracture, intertrochanteric (H)     Acute blood loss anemia     Hyponatremia     H/O mastectomy, right     Nausea     Past Medical History:   Diagnosis Date     Acute blood loss anemia 6/22/2014     Acute Duodenal Ulcer     Created by Conversion      Atrial fibrillation (H)     Created by Conversion      Breast cancer (H)      Hyponatremia 6/22/2014      Hypothyroidism     Created by Conversion      Nontoxic multinodular goiter     Created by Conversion      Peptic Ulcer     Created by Conversion      Peripheral Neuropathy     Created by Conversion      Thyroid Cancer     Created by Conversion      Ulcerative Colitis     Created by Conversion       Past Surgical History:   Procedure Laterality Date     ABDOMINAL SURGERY      perforated ulcer     MASTECTOMY      Right side      KY MASTECTOMY, SIMPLE, COMPLETE      Description: Simple Mastectomy Right Breast;  Recorded: 10/30/2009;     KY TREAT INTER/SUBTROCH FX,W/PLATE/SCREW      Description: Open Treatment Of An Intertrochanteric Fracture;  Recorded: 07/24/2014;     THYROID SURGERY      malignant nodule removed       Family History   Problem Relation Age of Onset     No Medical Problems Mother      No Medical Problems Father       Social History     Socioeconomic History     Marital status:      Spouse name: Not on file     Number of children: Not on file     Years of education: Not on file     Highest education level: Not on file   Occupational History     Not on file   Social Needs     Financial resource strain: Not on file     Food insecurity:     Worry: Not on file     Inability: Not on file     Transportation needs:     Medical: Not on file     Non-medical: Not on file   Tobacco Use     Smoking status: Never Smoker     Smokeless tobacco: Never Used   Substance and Sexual Activity     Alcohol use: Yes     Alcohol/week: 0.0 oz     Comment: 1/month     Drug use: No     Sexual activity: Not on file   Lifestyle     Physical activity:     Days per week: Not on file     Minutes per session: Not on file     Stress: Not on file   Relationships     Social connections:     Talks on phone: Not on file     Gets together: Not on file     Attends Jehovah's witness service: Not on file     Active member of club or organization: Not on file     Attends meetings of clubs or organizations: Not on file     Relationship status: Not  "on file     Intimate partner violence:     Fear of current or ex partner: Not on file     Emotionally abused: Not on file     Physically abused: Not on file     Forced sexual activity: Not on file   Other Topics Concern     Not on file   Social History Narrative     Not on file      Current Outpatient Medications   Medication Sig Dispense Refill     acetaminophen (TYLENOL) 500 MG tablet Take 500-1,000 mg by mouth every 6 (six) hours as needed for pain.       aspirin 81 MG EC tablet Take 1 tablet (81 mg total) by mouth daily.  0     calcium carbonate-vitamin D2 500 mg(1,250mg) -200 unit tablet Take 1 tablet by mouth daily.       cholecalciferol, vitamin D3, 1,000 unit tablet Take 1,000 Units by mouth daily.       cyanocobalamin (VITAMIN B-12) 500 MCG tablet Take by mouth daily.       levothyroxine (SYNTHROID, LEVOTHROID) 100 MCG tablet One daily 90 tablet 3     meclizine (ANTIVERT) 12.5 mg tablet Take 1 tablet (12.5 mg total) by mouth 3 (three) times a day as needed. 30 tablet 0     No current facility-administered medications for this visit.       Objective:   Vital Signs:   Visit Vitals  /70   Pulse 88   Ht 5' 2.5\" (1.588 m)   Wt 141 lb 1.6 oz (64 kg)   BMI 25.40 kg/m         VisionScreening:  No exam data present     PHYSICAL EXAM  General Appearance:  Alert, cooperative, no distress  Head:  Normocephalic, no obvious abnormality  Ears: TM anatomy normal  Eyes:  PERRL, EOM's intact, conjunctiva and corneas clear  Nose:  Nares symmetrical, septum midline, mucosa pink, no sinus tenderness  Throat:  Lips, tongue, and mucosa are moist, pink, and intact  Neck:  Supple, symmetrical, trachea midline, no adenopathy; thyroid: no enlargement, symmetric,no tenderness/mass/nodules; no carotid bruit, no JVD  Back:  Symmetrical, no curvature, ROM normal, no CVA tenderness  Chest/Breast:  No mass or tenderness  Lungs:  Clear to auscultation bilaterally, respirations unlabored   Heart:  Normal PMI, regular rate & rhythm, S1 " and S2 normal, no murmurs, rubs, or gallops  Abdomen:  Soft, non-tender, bowel sounds active all four quadrants, no mass, or organomegaly  Musculoskeletal:  Tone and strength strong and symmetrical, all extremities patient has age-related kyphosis  Lymphatic:  No adenopathy  Skin/Hair/Nails:  Skin warm, dry, and intact, no rashes  Neurologic:  Alert and oriented x3, no cranial nerve deficits, normal strength and tone, gait steady but a little antalgic due to age and previous surgeries  Extremities:  No edema.  Blu's sign negative.    Genitourinary: deferred  Pulses:  Equal bilaterally    Assessment Results 3/11/2019   Activities of Daily Living 1 - Full function   Instrumental Activities of Daily Living 2-4 - Moderate impairment   Mini Cog Total Score 5   Some recent data might be hidden     A Mini-Cog score of 0-2 suggests the possibility of dementia, score of 3-5 suggests no dementia    Identified Health Risks:     She is at risk for lack of exercise and has been provided with information to increase physical activity for the benefit of her well-being.  The patient was counseled and encouraged to consider modifying their diet and eating habits. She was provided with information on recommended healthy diet options.  The patient reports that she has difficulty with activities of daily living. I have asked that the patient make a follow up appointment in 52 weeks where this issue will be further evaluated and addressed.  The patient reports that she has difficulty with instrumental activities of daily living.  She was provided with a list of local organizations that provide support services and advised to make a follow up appointment in 52 weeks to address this further.   The patient was provided with written information regarding signs of hearing loss.  Patient's advanced directive was discussed and I am comfortable with the patient's wishes.

## 2021-06-27 ENCOUNTER — HEALTH MAINTENANCE LETTER (OUTPATIENT)
Age: 86
End: 2021-06-27

## 2021-07-13 ENCOUNTER — RECORDS - HEALTHEAST (OUTPATIENT)
Dept: ADMINISTRATIVE | Facility: CLINIC | Age: 86
End: 2021-07-13

## 2021-09-10 ENCOUNTER — TELEPHONE (OUTPATIENT)
Dept: FAMILY MEDICINE | Facility: CLINIC | Age: 86
End: 2021-09-10

## 2021-09-10 ENCOUNTER — NURSE TRIAGE (OUTPATIENT)
Dept: NURSING | Facility: CLINIC | Age: 86
End: 2021-09-10

## 2021-09-10 NOTE — TELEPHONE ENCOUNTER
Triage call:   Consent on file- not with her mother but only lives across the street  Recently seen within the last month with PCP  Merry Bingham- states she is not on medication currently  For the last 2 weeks patient has been more fatigued  BP was 115/68 p 70  Daughter has been making sure she eats and drinks  Using her mobility devices to help   Otherwise denies additional symptoms   Daughter also wanted to discuss starting home care for patient as well    Advised to be seen within the next 3 days - reviewed additional care advice with patients daughter and she verbalizes understanding. Assisted in transferring to scheduling.     Valerie Pinon RN BSN 9/10/2021 12:53 PM    COVID 19 Nurse Triage Plan/Patient Instructions    Please be aware that novel coronavirus (COVID-19) may be circulating in the community. If you develop symptoms such as fever, cough, or SOB or if you have concerns about the presence of another infection including coronavirus (COVID-19), please contact your health care provider or visit [email or web address].     Disposition/Instructions    In-Person Visit with provider recommended. Reference Visit Selection Guide.    Thank you for taking steps to prevent the spread of this virus.  o Limit your contact with others.  o Wear a simple mask to cover your cough.  o Wash your hands well and often.    Resources    [facility]: About COVID-19: [email or web address]    CDC: What to Do If You're Sick: www.cdc.gov/coronavirus/2019-ncov/about/steps-when-sick.html    CDC: Ending Home Isolation: www.cdc.gov/coronavirus/2019-ncov/hcp/disposition-in-home-patients.html     CDC: Caring for Someone: www.cdc.gov/coronavirus/2019-ncov/if-you-are-sick/care-for-someone.html     [facility]: Interim Guidance for Hospital Discharge to Home: www.health.[facility].[place]/diseases/coronavirus/hcp/hospdischarge.pdf    [facility] clinical trials (COVID-19 research studies):  [email or web address]-clinical-trials     Below are the COVID-19 hotlines at the [place] Department of [facility] ([facility]). Interpreters are available.   o For health questions: Call 299-337-7694 or 1-243.177.1503 (7 a.m. to 7 p.m.)  o For questions about schools and childcare: Call 648-096-9485 or 1-641.671.7722 (7 a.m. to 7 p.m.)       Reason for Disposition    Fatigue (i.e., tires easily, decreased energy) and persists > 1 week    Additional Information    Negative: Severe difficulty breathing (e.g., struggling for each breath, speaks in single words)    Negative: Shock suspected (e.g., cold/pale/clammy skin, too weak to stand, low BP, rapid pulse)    Negative: Difficult to awaken or acting confused (e.g., disoriented, slurred speech)    Negative: Fainted > 15 minutes ago and still feels too weak or dizzy to stand    Negative: SEVERE weakness (i.e., unable to walk or barely able to walk, requires support) and new onset or worsening    Negative: Sounds like a life-threatening emergency to the triager    Negative: Weakness of the face, arm or leg on one side of the body    Negative: Has diabetes and weakness from low blood sugar (i.e., < 60 mg/dL or 3.5 mmol/L)    Negative: Recent heat exposure, suspected cause of weakness    Negative: Vomiting is the main symptom    Negative: Diarrhea is the main symptom    Negative: Difficulty breathing    Negative: Heart beating < 50 beats per minute OR > 140 beats per minute    Negative: Extra heartbeats OR irregular heart beating (i.e., 'palpitations')    Negative: Follows bleeding (e.g., from vomiting, rectum, vagina) (Exception: small transient weakness from sight of a small amount blood)    Negative: Bloody, black, or tarry bowel movements (Exception: chronic-unchanged  black-grey bowel movements and is taking iron pills or Pepto-bismol)    Negative: MODERATE weakness from poor fluid intake with no improvement after 2 hours of rest and fluids    Negative: Drinking very  little and dehydration suspected (e.g., no urine > 12 hours, very dry mouth, very lightheaded)    Negative: Patient sounds very sick or weak to the triager    Negative: MODERATE weakness (i.e., interferes with work, school, normal activities) and cause unknown (Exceptions: weakness with acute minor illness, or weakness from poor fluid intake)    Negative: Fever > 103 F (39.4 C) and not able to get the Fever down using CARE ADVICE    Negative: Fever > 100.0 F (37.8 C) and bedridden (e.g., nursing home patient, stroke, chronic illness, recovering from surgery)    Negative: Fever > 101 F (38.3 C) and over 60 years of age    Negative: Fever > 100.0 F (37.8 C) and diabetes mellitus or weak immune system (e.g., HIV positive, cancer chemo, splenectomy, organ transplant, chronic steroids)    Negative: Pale skin (pallor)    Negative: MODERATE weakness (i.e., interferes with work, school, normal activities) and persists > 3 days    Negative: Taking a medicine that could cause weakness (e.g., blood pressure medications, diuretics)    Negative: Patient wants to be seen    Negative: MILD weakness (i.e., does not interfere with ability to work, go to school, normal activities) and persists > 1 week    Protocols used: WEAKNESS (GENERALIZED) AND FATIGUE-A-OH

## 2021-09-10 NOTE — TELEPHONE ENCOUNTER
Patients daughter called wanting to speak with Dr. Moore regarding a symptom she has noticed Glory has been feeling.     For the last 3/4 weeks Glory has complained about being extremely tired. She will fall asleep a lot through out the day and Akiny/Daughter is worried that this has something to do with her heart.     Gissel also thought that maybe having some home health care would be beneficial for patient.     Please advise and contact Daughter.

## 2021-09-11 ENCOUNTER — APPOINTMENT (OUTPATIENT)
Dept: CT IMAGING | Facility: CLINIC | Age: 86
End: 2021-09-11
Attending: EMERGENCY MEDICINE
Payer: COMMERCIAL

## 2021-09-11 ENCOUNTER — HOSPITAL ENCOUNTER (OUTPATIENT)
Facility: CLINIC | Age: 86
Setting detail: OBSERVATION
Discharge: HOME | End: 2021-09-12
Attending: EMERGENCY MEDICINE | Admitting: INTERNAL MEDICINE
Payer: COMMERCIAL

## 2021-09-11 ENCOUNTER — APPOINTMENT (OUTPATIENT)
Dept: RADIOLOGY | Facility: CLINIC | Age: 86
End: 2021-09-11
Attending: EMERGENCY MEDICINE
Payer: COMMERCIAL

## 2021-09-11 DIAGNOSIS — M62.81 GENERALIZED MUSCLE WEAKNESS: ICD-10-CM

## 2021-09-11 DIAGNOSIS — D64.9 ANEMIA, UNSPECIFIED TYPE: ICD-10-CM

## 2021-09-11 PROBLEM — Z85.850 HISTORY OF THYROID CANCER: Chronic | Status: ACTIVE | Noted: 2021-09-11

## 2021-09-11 PROBLEM — Z87.81 HISTORY OF HIP FRACTURE: Status: ACTIVE | Noted: 2021-09-11

## 2021-09-11 LAB
ALBUMIN SERPL-MCNC: 2.5 G/DL (ref 3.5–5)
ALP SERPL-CCNC: 58 U/L (ref 45–120)
ALT SERPL W P-5'-P-CCNC: <9 U/L (ref 0–45)
ANION GAP SERPL CALCULATED.3IONS-SCNC: 7 MMOL/L (ref 5–18)
AST SERPL W P-5'-P-CCNC: 17 U/L (ref 0–40)
BASOPHILS # BLD MANUAL: 0.2 10E3/UL (ref 0–0.2)
BASOPHILS NFR BLD MANUAL: 2 %
BILIRUB SERPL-MCNC: 1.1 MG/DL (ref 0–1)
BNP SERPL-MCNC: 197 PG/ML (ref 0–167)
BUN SERPL-MCNC: 16 MG/DL (ref 8–28)
CALCIUM SERPL-MCNC: 8.8 MG/DL (ref 8.5–10.5)
CHLORIDE BLD-SCNC: 106 MMOL/L (ref 98–107)
CO2 SERPL-SCNC: 24 MMOL/L (ref 22–31)
CREAT SERPL-MCNC: 0.81 MG/DL (ref 0.6–1.1)
CREAT SERPL-MCNC: 0.85 MG/DL (ref 0.6–1.1)
EOSINOPHIL # BLD MANUAL: 0 10E3/UL (ref 0–0.7)
EOSINOPHIL NFR BLD MANUAL: 0 %
ERYTHROCYTE [DISTWIDTH] IN BLOOD BY AUTOMATED COUNT: 17.4 % (ref 10–15)
GFR SERPL CREATININE-BSD FRML MDRD: 58 ML/MIN/1.73M2
GFR SERPL CREATININE-BSD FRML MDRD: 61 ML/MIN/1.73M2
GLUCOSE BLD-MCNC: 103 MG/DL (ref 70–125)
HCT VFR BLD AUTO: 30.8 % (ref 35–47)
HGB BLD-MCNC: 9.9 G/DL (ref 11.7–15.7)
LYMPHOCYTES # BLD MANUAL: 0.6 10E3/UL (ref 0.8–5.3)
LYMPHOCYTES NFR BLD MANUAL: 6 %
MAGNESIUM SERPL-MCNC: 1.8 MG/DL (ref 1.8–2.6)
MCH RBC QN AUTO: 28.6 PG (ref 26.5–33)
MCHC RBC AUTO-ENTMCNC: 32.1 G/DL (ref 31.5–36.5)
MCV RBC AUTO: 89 FL (ref 78–100)
MONOCYTES # BLD MANUAL: 1.7 10E3/UL (ref 0–1.3)
MONOCYTES NFR BLD MANUAL: 18 %
NEUTROPHILS # BLD MANUAL: 6.8 10E3/UL (ref 1.6–8.3)
NEUTROPHILS NFR BLD MANUAL: 74 %
PLAT MORPH BLD: ABNORMAL
PLATELET # BLD AUTO: 149 10E3/UL (ref 150–450)
POTASSIUM BLD-SCNC: 4.1 MMOL/L (ref 3.5–5)
PROT SERPL-MCNC: 5.4 G/DL (ref 6–8)
RBC # BLD AUTO: 3.46 10E6/UL (ref 3.8–5.2)
RBC MORPH BLD: ABNORMAL
SARS-COV-2 RNA RESP QL NAA+PROBE: NEGATIVE
SODIUM SERPL-SCNC: 137 MMOL/L (ref 136–145)
TSH SERPL DL<=0.005 MIU/L-ACNC: 0.15 UIU/ML (ref 0.3–5)
WBC # BLD AUTO: 9.2 10E3/UL (ref 4–11)

## 2021-09-11 PROCEDURE — 99223 1ST HOSP IP/OBS HIGH 75: CPT | Performed by: INTERNAL MEDICINE

## 2021-09-11 PROCEDURE — 250N000013 HC RX MED GY IP 250 OP 250 PS 637: Performed by: INTERNAL MEDICINE

## 2021-09-11 PROCEDURE — 84439 ASSAY OF FREE THYROXINE: CPT | Performed by: EMERGENCY MEDICINE

## 2021-09-11 PROCEDURE — 71046 X-RAY EXAM CHEST 2 VIEWS: CPT

## 2021-09-11 PROCEDURE — 99285 EMERGENCY DEPT VISIT HI MDM: CPT | Mod: 25

## 2021-09-11 PROCEDURE — C9803 HOPD COVID-19 SPEC COLLECT: HCPCS

## 2021-09-11 PROCEDURE — 80053 COMPREHEN METABOLIC PANEL: CPT | Performed by: EMERGENCY MEDICINE

## 2021-09-11 PROCEDURE — 258N000003 HC RX IP 258 OP 636: Performed by: EMERGENCY MEDICINE

## 2021-09-11 PROCEDURE — 83735 ASSAY OF MAGNESIUM: CPT | Performed by: EMERGENCY MEDICINE

## 2021-09-11 PROCEDURE — 36415 COLL VENOUS BLD VENIPUNCTURE: CPT | Performed by: EMERGENCY MEDICINE

## 2021-09-11 PROCEDURE — 93005 ELECTROCARDIOGRAM TRACING: CPT

## 2021-09-11 PROCEDURE — 82565 ASSAY OF CREATININE: CPT | Performed by: INTERNAL MEDICINE

## 2021-09-11 PROCEDURE — 96372 THER/PROPH/DIAG INJ SC/IM: CPT | Mod: XU | Performed by: INTERNAL MEDICINE

## 2021-09-11 PROCEDURE — 258N000003 HC RX IP 258 OP 636: Performed by: INTERNAL MEDICINE

## 2021-09-11 PROCEDURE — 999N000157 HC STATISTIC RCP TIME EA 10 MIN

## 2021-09-11 PROCEDURE — 83880 ASSAY OF NATRIURETIC PEPTIDE: CPT | Mod: GZ | Performed by: EMERGENCY MEDICINE

## 2021-09-11 PROCEDURE — 87635 SARS-COV-2 COVID-19 AMP PRB: CPT | Performed by: EMERGENCY MEDICINE

## 2021-09-11 PROCEDURE — 93010 ELECTROCARDIOGRAM REPORT: CPT | Performed by: INTERNAL MEDICINE

## 2021-09-11 PROCEDURE — 70450 CT HEAD/BRAIN W/O DYE: CPT

## 2021-09-11 PROCEDURE — 250N000011 HC RX IP 250 OP 636: Performed by: INTERNAL MEDICINE

## 2021-09-11 PROCEDURE — 84443 ASSAY THYROID STIM HORMONE: CPT | Performed by: EMERGENCY MEDICINE

## 2021-09-11 PROCEDURE — 85027 COMPLETE CBC AUTOMATED: CPT | Performed by: EMERGENCY MEDICINE

## 2021-09-11 PROCEDURE — 36415 COLL VENOUS BLD VENIPUNCTURE: CPT | Performed by: INTERNAL MEDICINE

## 2021-09-11 PROCEDURE — 93005 ELECTROCARDIOGRAM TRACING: CPT | Performed by: EMERGENCY MEDICINE

## 2021-09-11 PROCEDURE — 120N000001 HC R&B MED SURG/OB

## 2021-09-11 PROCEDURE — 93005 ELECTROCARDIOGRAM TRACING: CPT | Performed by: INTERNAL MEDICINE

## 2021-09-11 PROCEDURE — 96360 HYDRATION IV INFUSION INIT: CPT

## 2021-09-11 RX ORDER — SODIUM CHLORIDE, SODIUM LACTATE, POTASSIUM CHLORIDE, CALCIUM CHLORIDE 600; 310; 30; 20 MG/100ML; MG/100ML; MG/100ML; MG/100ML
INJECTION, SOLUTION INTRAVENOUS CONTINUOUS
Status: DISCONTINUED | OUTPATIENT
Start: 2021-09-11 | End: 2021-09-12 | Stop reason: HOSPADM

## 2021-09-11 RX ORDER — SENNOSIDES 8.6 MG
8.6 TABLET ORAL 2 TIMES DAILY
Status: DISCONTINUED | OUTPATIENT
Start: 2021-09-11 | End: 2021-09-12 | Stop reason: HOSPADM

## 2021-09-11 RX ORDER — ASPIRIN 81 MG/1
81 TABLET ORAL DAILY
Status: DISCONTINUED | OUTPATIENT
Start: 2021-09-11 | End: 2021-09-12 | Stop reason: HOSPADM

## 2021-09-11 RX ORDER — PROCHLORPERAZINE MALEATE 5 MG
5 TABLET ORAL EVERY 6 HOURS PRN
Status: DISCONTINUED | OUTPATIENT
Start: 2021-09-11 | End: 2021-09-12 | Stop reason: HOSPADM

## 2021-09-11 RX ORDER — ONDANSETRON 2 MG/ML
4 INJECTION INTRAMUSCULAR; INTRAVENOUS EVERY 6 HOURS PRN
Status: DISCONTINUED | OUTPATIENT
Start: 2021-09-11 | End: 2021-09-12 | Stop reason: HOSPADM

## 2021-09-11 RX ORDER — LIDOCAINE 40 MG/G
CREAM TOPICAL
Status: DISCONTINUED | OUTPATIENT
Start: 2021-09-11 | End: 2021-09-12 | Stop reason: HOSPADM

## 2021-09-11 RX ORDER — PROCHLORPERAZINE 25 MG
12.5 SUPPOSITORY, RECTAL RECTAL EVERY 12 HOURS PRN
Status: DISCONTINUED | OUTPATIENT
Start: 2021-09-11 | End: 2021-09-12 | Stop reason: HOSPADM

## 2021-09-11 RX ORDER — POLYETHYLENE GLYCOL 3350 17 G/17G
17 POWDER, FOR SOLUTION ORAL DAILY PRN
Status: DISCONTINUED | OUTPATIENT
Start: 2021-09-11 | End: 2021-09-11

## 2021-09-11 RX ORDER — CALCIUM CARBONATE/VITAMIN D3 600 MG-10
500 TABLET ORAL DAILY
Status: DISCONTINUED | OUTPATIENT
Start: 2021-09-11 | End: 2021-09-12 | Stop reason: HOSPADM

## 2021-09-11 RX ORDER — ONDANSETRON 4 MG/1
4 TABLET, ORALLY DISINTEGRATING ORAL EVERY 6 HOURS PRN
Status: DISCONTINUED | OUTPATIENT
Start: 2021-09-11 | End: 2021-09-12 | Stop reason: HOSPADM

## 2021-09-11 RX ORDER — LEVOTHYROXINE SODIUM 50 UG/1
100 TABLET ORAL DAILY
Status: DISCONTINUED | OUTPATIENT
Start: 2021-09-12 | End: 2021-09-12 | Stop reason: HOSPADM

## 2021-09-11 RX ORDER — MECLIZINE HCL 12.5 MG 12.5 MG/1
12.5 TABLET ORAL 3 TIMES DAILY PRN
Status: DISCONTINUED | OUTPATIENT
Start: 2021-09-11 | End: 2021-09-12 | Stop reason: HOSPADM

## 2021-09-11 RX ORDER — ACETAMINOPHEN 500 MG
500-1000 TABLET ORAL EVERY 6 HOURS PRN
Status: DISCONTINUED | OUTPATIENT
Start: 2021-09-11 | End: 2021-09-12 | Stop reason: HOSPADM

## 2021-09-11 RX ORDER — POLYETHYLENE GLYCOL 3350 17 G/17G
17 POWDER, FOR SOLUTION ORAL DAILY PRN
Status: DISCONTINUED | OUTPATIENT
Start: 2021-09-11 | End: 2021-09-12 | Stop reason: HOSPADM

## 2021-09-11 RX ADMIN — ASPIRIN 81 MG: 81 TABLET, DELAYED RELEASE ORAL at 21:30

## 2021-09-11 RX ADMIN — SENNOSIDES 8.6 MG: 8.6 TABLET, FILM COATED ORAL at 21:30

## 2021-09-11 RX ADMIN — SODIUM CHLORIDE 500 ML: 9 INJECTION, SOLUTION INTRAVENOUS at 16:44

## 2021-09-11 RX ADMIN — Medication 500 MCG: at 21:30

## 2021-09-11 RX ADMIN — SODIUM CHLORIDE, POTASSIUM CHLORIDE, SODIUM LACTATE AND CALCIUM CHLORIDE: 600; 310; 30; 20 INJECTION, SOLUTION INTRAVENOUS at 21:24

## 2021-09-11 RX ADMIN — ENOXAPARIN SODIUM 40 MG: 100 INJECTION SUBCUTANEOUS at 21:29

## 2021-09-11 ASSESSMENT — ENCOUNTER SYMPTOMS
MYALGIAS: 0
SHORTNESS OF BREATH: 0
FEVER: 1
ABDOMINAL PAIN: 0
NAUSEA: 0
WEAKNESS: 1
FLANK PAIN: 0

## 2021-09-11 ASSESSMENT — ACTIVITIES OF DAILY LIVING (ADL): DEPENDENT_IADLS:: CLEANING;COOKING;LAUNDRY;SHOPPING;MEAL PREPARATION;TRANSPORTATION;INCONTINENCE

## 2021-09-11 NOTE — CONSULTS
Care Management Initial Consult    General Information  Assessment completed with: Patient, Children,         Primary Care Provider verified and updated as needed: Yes   Readmission within the last 30 days:        Reason for Consult: discharge planning  Advance Care Planning: Advance Care Planning Reviewed: other (comment) (Given [facility] contact information to send)     General Information Comments: Lives alone and hard of hearing    Communication Assessment  Patient's communication style: spoken language (English or Bilingual)    Hearing Difficulty or Deaf: yes   Wear Glasses or Blind: yes    Cognitive  Cognitive/Neuro/Behavioral: WDL                      Living Environment:   People in home: alone     Current living Arrangements: house      Able to return to prior arrangements: other (see comments) (Pending clinical progress)  Living Arrangement Comments: Daughter Gissel lives across the street    Family/Social Support:  Care provided by: self, child(wilber)  Provides care for: no one, unable/limited ability to care for self  Marital Status:   Children          Description of Support System: Supportive, Involved    Support Assessment: Lacks adequate physical care, Other (see comments) (Will need home services on disccharge)    Current Resources:   Patient receiving home care services: No     Community Resources: None  Equipment currently used at home: shower chair, walker, rolling  Supplies currently used at home: Incontinence Supplies    Employment/Financial:  Employment Status: retired        Financial Concerns: No concerns identified           Lifestyle & Psychosocial Needs:  Social Determinants of Health     Tobacco Use: Low Risk      Smoking Tobacco Use: Never Smoker     Smokeless Tobacco Use: Never Used   Alcohol Use:      Frequency of Alcohol Consumption:      Average Number of Drinks:      Frequency of Binge Drinking:    Financial Resource Strain:      Difficulty of Paying Living Expenses:     Food Insecurity:      Worried About Running Out of Food in the Last Year:      Ran Out of Food in the Last Year:    Transportation Needs:      Lack of Transportation (Medical):      Lack of Transportation (Non-Medical):    Physical Activity:      Days of Exercise per Week:      Minutes of Exercise per Session:    Stress:      Feeling of Stress :    Social Connections:      Frequency of Communication with Friends and Family:      Frequency of Social Gatherings with Friends and Family:      Attends Yazdanism Services:      Active Member of Clubs or Organizations:      Attends Club or Organization Meetings:      Marital Status:    Intimate Partner Violence:      Fear of Current or Ex-Partner:      Emotionally Abused:      Physically Abused:      Sexually Abused:    Depression: Not at risk     PHQ-2 Score: 0   Housing Stability:      Unable to Pay for Housing in the Last Year:      Number of Places Lived in the Last Year:      Unstable Housing in the Last Year:        Functional Status:  Prior to admission patient needed assistance:   Dependent ADLs:: Ambulation-walker, Incontinence, Toileting  Dependent IADLs:: Cleaning, Cooking, Laundry, Shopping, Meal Preparation, Transportation, Incontinence  Assesssment of Functional Status: Not at baseline with ADL Functioning    Mental Health Status:          Chemical Dependency Status:                Values/Beliefs:  Spiritual, Cultural Beliefs, Yazdanism Practices, Values that affect care:                 Additional Information:  Patient lives alone in a house. Chris Chauhan lives across the street and checks in with patient a couple of times daily. Patient uses a walker and prior to this illness was able to perform most ADLs independently. Set-up and self-administered all her medications, cooked and ate meals. Chris Chauhan helped with housekeeping and transportation. No home care services but is interested in having services on discharge, if appropriate. Daughter will  transport patient on discharge.    JAMISON ADAMES RN

## 2021-09-11 NOTE — ED PROVIDER NOTES
Emergency Department Encounter      NAME: Glory Mason  AGE: 89 year old female  YOB: 1924  MRN: [id number]  EVALUATION DATE & TIME: 2021  3:45 PM    PCP: Roland Moore    ED PROVIDER: Presley Mejia M.D.      Chief Complaint   Patient presents with     Fatigue         FINAL IMPRESSION:  1. Generalized muscle weakness    2. Anemia, unspecified type        MEDICAL DECISION MAKIN:16 PM I met with the patient, obtained history, performed an initial exam, and discussed options and plan for diagnostics and treatment here in the ED.   Pertinent Labs & Imaging studies reviewed. (See chart for details)     This patient is a 89-year-old female who lives at her own home by herself.  She is brought to the ER by her daughter who lives across the street from her.  Her daughter says that over the past 2 weeks she has had increasing weakness and decreased energy level.  Her daughter says that it has gotten to the point where today she has been in bed all day and too fatigued to take care of herself.  She does not have any other specific symptoms.  No chest pain or shortness of breath.  No fevers or chills.  No cough or sputum.  No orthopnea.  No pedal edema.  There is no significant findings on exam.  A head CT was done and did not show any acute findings.  A chest x-ray showed some cardiomegaly and mild pulmonary vascular congestion.  A BNP is pending.  There is a suggestion of COPD as well but the patient's lungs were clear.  Aside from that she had a normal magnesium, comprehensive metabolic panel.  Hemoglobin was slightly low at 9.9 but this should not cause her level of weakness.  Hearing is pending as well.  TSH was checked and she was not hypothyroid in fact the TSH was slightly low.  The patient will be admitted to Dr. Jansen who is the hospitalist at [facility] to the [facility] for further evaluation of the weakness and possible placement before she is able to go back home and care for  herself.      The importance of close follow up was discussed. We reviewed warning signs and symptoms, and I instructed Ms. Mason to return to the emergency department immediately if she develops any new or worsening symptoms. I provided additional verbal discharge instructions. Ms. Mason expressed understanding and agreement with this plan of care, her questions were answered, and she was discharged in stable condition.     MEDICATIONS GIVEN IN THE EMERGENCY:  Medications   0.9% sodium chloride BOLUS (0 mLs Intravenous Stopped 9/11/21 0822)       NEW PRESCRIPTIONS STARTED AT TODAY'S ER VISIT:  New Prescriptions    No medications on file          =================================================================    HPI    Patient information was obtained from: Daughter    Use of : N/A        Pennoswald Mason is a 89 year old female with a past medical history of A-fib, who presents for fatigue and weakness.     Daughter reports increased fatigued and generalized weakness for the patient over the past four weeks that has progressively gotten worse. Patient does live at home alone, but daughter checks on patient several times a day. Daughter states about two weeks ago is when they noticed it was impacting the patient, because she was too tired to go to the grocery store and now patient is currently falling asleep several times during the day. Denies nausea, myalgias, shortness of breath, chest pain, abdominal pain, flank pain, or additional medical concerns or complaints at this time.       REVIEW OF SYSTEMS   Review of Systems   Constitutional: Positive for fever.   Respiratory: Negative for shortness of breath.    Cardiovascular: Negative for chest pain.   Gastrointestinal: Negative for abdominal pain and nausea.   Genitourinary: Negative for flank pain.   Musculoskeletal: Negative for myalgias.   Neurological: Positive for weakness (generalized).   All other systems reviewed and are negative.       PAST  MEDICAL HISTORY:  Past Medical History:   Diagnosis Date     A-fib (H)        PAST SURGICAL HISTORY:  Past Surgical History:   Procedure Laterality Date     ABDOMEN SURGERY      perforated ulcer     BIOPSY SKIN (LOCATION)       BREAST SURGERY       C OPEN FIX INTER/SUBTROCH FX,PLATE      Description: Open Treatment Of An Intertrochanteric Fracture;  Recorded: 07/24/2014;     EYE SURGERY      cataracts     FRACTURE SURGERY       MASTECTOMY      Right side      MN MASTECTOMY, SIMPLE, COMPLETE      Description: Simple Mastectomy Right Breast;  Recorded: 10/30/2009;     THYROID SURGERY      malignant nodule removed        CURRENT MEDICATIONS:    No current facility-administered medications for this encounter.    Current Outpatient Medications:      acetaminophen (TYLENOL) 500 MG tablet, [ACETAMINOPHEN (TYLENOL) 500 MG TABLET] Take 500-1,000 mg by mouth every 6 (six) hours as needed for pain., Disp: , Rfl:      aspirin 81 MG EC tablet, [ASPIRIN 81 MG EC TABLET] Take 1 tablet (81 mg total) by mouth daily., Disp: , Rfl: 0     calcium carbonate-vitamin D2 500 mg(1,250mg) -200 unit tablet, [CALCIUM CARBONATE-VITAMIN D2 500 MG(1,250MG) -200 UNIT TABLET] Take 1 tablet by mouth daily., Disp: , Rfl:      cholecalciferol, vitamin D3, 1,000 unit tablet, [CHOLECALCIFEROL, VITAMIN D3, 1,000 UNIT TABLET] Take 1,000 Units by mouth daily., Disp: , Rfl:      cyanocobalamin (VITAMIN B-12) 500 MCG tablet, [CYANOCOBALAMIN (VITAMIN B-12) 500 MCG TABLET] Take 500 mcg by mouth daily.       , Disp: , Rfl:      famotidine (PEPCID) 20 MG tablet, [FAMOTIDINE (PEPCID) 20 MG TABLET] Take 1 tablet (20 mg total) by mouth daily., Disp: , Rfl: 0     levothyroxine (SYNTHROID, LEVOTHROID) 100 MCG tablet, [LEVOTHYROXINE (SYNTHROID, LEVOTHROID) 100 MCG TABLET] TAKE 1 TABLET BY MOUTH DAILY, Disp: 90 tablet, Rfl: 3     meclizine (ANTIVERT) 12.5 mg tablet, [MECLIZINE (ANTIVERT) 12.5 MG TABLET] Take 1 tablet (12.5 mg total) by mouth 3 (three) times a day as  needed., Disp: 30 tablet, Rfl: 0     polyethylene glycol (MIRALAX) 17 gram packet, [POLYETHYLENE GLYCOL (MIRALAX) 17 GRAM PACKET] Take 1 packet (17 g total) by mouth daily as needed (constipation)., Disp: , Rfl: 0    ALLERGIES:  No Known Allergies    FAMILY HISTORY:  Family History   Problem Relation Age of Onset     No Known Problems Mother      No Known Problems Father        SOCIAL HISTORY:   Social History     Socioeconomic History     Marital status:      Spouse name: Not on file     Number of children: Not on file     Years of education: Not on file     Highest education level: Not on file   Occupational History     Not on file   Tobacco Use     Smoking status: Never Smoker     Smokeless tobacco: Never Used   Substance and Sexual Activity     Alcohol use: Yes     Alcohol/week: 0.0 standard drinks     Comment: Alcoholic Drinks/day: 1/month     Drug use: No     Sexual activity: Not Currently   Other Topics Concern     Not on file   Social History Narrative     Not on file     Social Determinants of Health     Financial Resource Strain:      Difficulty of Paying Living Expenses:    Food Insecurity:      Worried About Running Out of Food in the Last Year:      Ran Out of Food in the Last Year:    Transportation Needs:      Lack of Transportation (Medical):      Lack of Transportation (Non-Medical):    Physical Activity:      Days of Exercise per Week:      Minutes of Exercise per Session:    Stress:      Feeling of Stress :    Social Connections:      Frequency of Communication with Friends and Family:      Frequency of Social Gatherings with Friends and Family:      Attends Buddhist Services:      Active Member of Clubs or Organizations:      Attends Club or Organization Meetings:      Marital Status:    Intimate Partner Violence:      Fear of Current or Ex-Partner:      Emotionally Abused:      Physically Abused:      Sexually Abused:        PHYSICAL EXAM:    Vitals: BP (!) 146/69   Pulse 71   Temp  98.2  F (36.8  C) (Oral)   Resp 18   Wt 59 kg (130 lb)   SpO2 95%   BMI 23.78 kg/m     Constitutional: Well developed, well nourished. Comfortable appearing, thin, frail, elderly female who speaks softly, but otherwise in NAD.  HENT: Normocephalic, atraumatic, mucous membranes moist, nose normal.   Neck- Supple, gross ROM intact.   Eyes: Pupils mid-range, sclera white, no discharge  Respiratory: Clear to auscultation bilaterally, no respiratory distress, no wheezing, speaks full sentences easily.  Cardiovascular: Irregularly, irregular rate and rhythm, no murmurs. No lower extremity edema, 2+ DP pulses.   GI: Soft, no tenderness to deep palpation in all quadrants, no masses.  Musculoskeletal: Moving all 4 extremities intentionally and without pain. No obvious deformity.  Skin: Warm, dry, no rash.  Neurologic: Alert & oriented x 3, speech clear, moving all extremities spontaneously   Psychiatric: Affect normal, cooperative.     LAB:  All pertinent labs reviewed and interpreted.  Labs Ordered and Resulted from Time of ED Arrival Up to the Time of Departure from the ED   COMPREHENSIVE METABOLIC PANEL - Abnormal; Notable for the following components:       Result Value    Protein Total 5.4 (*)     Albumin 2.5 (*)     Bilirubin Total 1.1 (*)     GFR Estimate 58 (*)     All other components within normal limits   TSH WITH FREE T4 REFLEX - Abnormal; Notable for the following components:    TSH 0.15 (*)     All other components within normal limits   CBC WITH PLATELETS AND DIFFERENTIAL - Abnormal; Notable for the following components:    RBC Count 3.46 (*)     Hemoglobin 9.9 (*)     Hematocrit 30.8 (*)     RDW 17.4 (*)     Platelet Count 149 (*)     All other components within normal limits   DIFFERENTIAL - Abnormal; Notable for the following components:    Absolute Lymphocytes 0.6 (*)     Absolute Monocytes 1.7 (*)     All other components within normal limits   MAGNESIUM - Normal   CBC WITH PLATELETS & DIFFERENTIAL     Narrative:     The following orders were created for panel order CBC with platelets differential.  Procedure                               Abnormality         Status                     ---------                               -----------         ------                     CBC with platelets and d...[[id number]]  Abnormal            Final result               Manual Differential[[id number]]          Abnormal            Final result                 Please view results for these tests on the individual orders.   ROUTINE UA WITH MICROSCOPIC REFLEX TO CULTURE   T4 FREE   B-TYPE NATRIURETIC PEPTIDE ( EAST ONLY)   COVID-19 VIRUS (CORONAVIRUS) BY PCR   PERIPHERAL IV CATHETER   CALL       RADIOLOGY:  XR Chest 2 Views   Final Result   IMPRESSION: Cardiomegaly. Mild pulmonary venous congestion. No definite evidence of pulmonary edema. Large lung volumes suggest COPD. Mild scarring bilaterally is unchanged. No pleural effusions. The bones are demineralized.      CT Head w/o Contrast   Final Result   IMPRESSION:   1.  No acute intracranial abnormality or significant change compared to the prior study.          EKG:   September 11, 2021 at 4:27 PM.  A. fib, 79 bpm.  Low voltage QRS.  There are no acute appearing changes when compared with the prior EKG dated August 3, 2019.  QRS duration 84 ms.  QTC duration 451 ms.  I have independently reviewed and interpreted the EKG(s) documented above.       I, Lilia Vu, am serving as a scribe to document services personally performed by Dr. Presley Mejia based on my observation and the provider's statements to me. IPresley M.D. attest that Lilia Vu is acting in a scribe capacity, has observed my performance of the services and has documented them in accordance with my direction.      Presley Mejia M.D.  Emergency Medicine  [facility] EMERGENCY ROOM  [address]  [address]  [phone]  Dept: [phone]         Presley Mejia MD  09/11/21 4015

## 2021-09-11 NOTE — PHARMACY-ADMISSION MEDICATION HISTORY
.  Pharmacy Note - Admission Medication History    Pertinent Provider Information:      ______________________________________________________________________    Prior To Admission (PTA) med list completed and updated in EMR.       PTA Med List   Medication Sig Last Dose     acetaminophen (TYLENOL) 500 MG tablet [ACETAMINOPHEN (TYLENOL) 500 MG TABLET] Take 500-1,000 mg by mouth every 6 (six) hours as needed for pain. 9/10/2021 at Unknown time     aspirin 81 MG EC tablet [ASPIRIN 81 MG EC TABLET] Take 1 tablet (81 mg total) by mouth daily. 9/10/2021 at Unknown time     cholecalciferol, vitamin D3, 1,000 unit tablet [CHOLECALCIFEROL, VITAMIN D3, 1,000 UNIT TABLET] Take 1,000 Units by mouth daily. 9/10/2021 at Unknown time     cyanocobalamin (VITAMIN B-12) 500 MCG tablet [CYANOCOBALAMIN (VITAMIN B-12) 500 MCG TABLET] Take 500 mcg by mouth daily.        9/10/2021 at Unknown time     levothyroxine (SYNTHROID, LEVOTHROID) 100 MCG tablet [LEVOTHYROXINE (SYNTHROID, LEVOTHROID) 100 MCG TABLET] TAKE 1 TABLET BY MOUTH DAILY 9/10/2021 at Unknown time     meclizine (ANTIVERT) 12.5 mg tablet [MECLIZINE (ANTIVERT) 12.5 MG TABLET] Take 1 tablet (12.5 mg total) by mouth 3 (three) times a day as needed. More than a month at Unknown time     polyethylene glycol (MIRALAX) 17 gram packet [POLYETHYLENE GLYCOL (MIRALAX) 17 GRAM PACKET] Take 1 packet (17 g total) by mouth daily as needed (constipation). More than a month at Unknown time       Information source(s): Patient's med list,  Patient, Family member and [facility]  Method of interview communication: in-person    Summary of Changes to PTA Med List  New: none  Discontinued: famotidine, calcium+D  Changed: none    Patient was asked about OTC/herbal products specifically.  PTA med list reflects this.    In the past week, patient estimated taking medication this percent of the time:  greater than 90%.    Allergies were reviewed, assessed, and updated with the patient.       Patient does not use any multi-dose medications prior to admission.    The information provided in this note is only as accurate as the sources available at the time of the update(s).    Thank you for the opportunity to participate in the care of this patient.    Trey Brown [facility]  9/11/2021 6:48 PM

## 2021-09-11 NOTE — ED TRIAGE NOTES
Pt arrived via EMS from home. Pt reports gradually worsening fatigue and decreased appetite over the past few weeks. States any time she sits down, she doesn't want to do anything but sleep. Pt is alert and oriented. Family at bedside.

## 2021-09-12 ENCOUNTER — APPOINTMENT (OUTPATIENT)
Dept: OCCUPATIONAL THERAPY | Facility: CLINIC | Age: 86
End: 2021-09-12
Attending: INTERNAL MEDICINE
Payer: COMMERCIAL

## 2021-09-12 ENCOUNTER — APPOINTMENT (OUTPATIENT)
Dept: PHYSICAL THERAPY | Facility: CLINIC | Age: 86
End: 2021-09-12
Attending: INTERNAL MEDICINE
Payer: COMMERCIAL

## 2021-09-12 ENCOUNTER — APPOINTMENT (OUTPATIENT)
Dept: CARDIOLOGY | Facility: CLINIC | Age: 86
End: 2021-09-12
Attending: INTERNAL MEDICINE
Payer: COMMERCIAL

## 2021-09-12 VITALS
DIASTOLIC BLOOD PRESSURE: 59 MMHG | SYSTOLIC BLOOD PRESSURE: 130 MMHG | OXYGEN SATURATION: 93 % | TEMPERATURE: 98.3 F | WEIGHT: 130 LBS | HEART RATE: 82 BPM | RESPIRATION RATE: 16 BRPM | BODY MASS INDEX: 23.78 KG/M2

## 2021-09-12 PROBLEM — D64.9 ANEMIA, UNSPECIFIED TYPE: Status: ACTIVE | Noted: 2021-09-12

## 2021-09-12 LAB
ALBUMIN UR-MCNC: 10 MG/DL
APPEARANCE UR: ABNORMAL
ATRIAL RATE - MUSE: 92 BPM
BILIRUB UR QL STRIP: NEGATIVE
COLOR UR AUTO: YELLOW
DIASTOLIC BLOOD PRESSURE - MUSE: NORMAL MMHG
GLUCOSE UR STRIP-MCNC: NEGATIVE MG/DL
HGB UR QL STRIP: NEGATIVE
INTERPRETATION ECG - MUSE: NORMAL
KETONES UR STRIP-MCNC: NEGATIVE MG/DL
LEUKOCYTE ESTERASE UR QL STRIP: ABNORMAL
LVEF ECHO: NORMAL
MUCOUS THREADS #/AREA URNS LPF: PRESENT /LPF
NITRATE UR QL: NEGATIVE
P AXIS - MUSE: NORMAL DEGREES
PH UR STRIP: 5.5 [PH] (ref 5–7)
PR INTERVAL - MUSE: NORMAL MS
QRS DURATION - MUSE: 84 MS
QT - MUSE: 394 MS
QTC - MUSE: 451 MS
R AXIS - MUSE: 8 DEGREES
RBC URINE: 6 /HPF
SP GR UR STRIP: 1.02 (ref 1–1.03)
SQUAMOUS EPITHELIAL: 3 /HPF
SYSTOLIC BLOOD PRESSURE - MUSE: NORMAL MMHG
T AXIS - MUSE: 20 DEGREES
T4 FREE SERPL-MCNC: 1.13 NG/DL (ref 0.7–1.8)
UROBILINOGEN UR STRIP-MCNC: 2 MG/DL
VENTRICULAR RATE- MUSE: 79 BPM
WBC URINE: 8 /HPF

## 2021-09-12 PROCEDURE — 93306 TTE W/DOPPLER COMPLETE: CPT

## 2021-09-12 PROCEDURE — 81001 URINALYSIS AUTO W/SCOPE: CPT | Performed by: EMERGENCY MEDICINE

## 2021-09-12 PROCEDURE — 250N000013 HC RX MED GY IP 250 OP 250 PS 637: Performed by: INTERNAL MEDICINE

## 2021-09-12 PROCEDURE — 93306 TTE W/DOPPLER COMPLETE: CPT | Mod: 26 | Performed by: INTERNAL MEDICINE

## 2021-09-12 PROCEDURE — 96361 HYDRATE IV INFUSION ADD-ON: CPT

## 2021-09-12 PROCEDURE — 258N000003 HC RX IP 258 OP 636: Performed by: INTERNAL MEDICINE

## 2021-09-12 PROCEDURE — G0378 HOSPITAL OBSERVATION PER HR: HCPCS

## 2021-09-12 PROCEDURE — 87086 URINE CULTURE/COLONY COUNT: CPT | Performed by: EMERGENCY MEDICINE

## 2021-09-12 PROCEDURE — 97166 OT EVAL MOD COMPLEX 45 MIN: CPT | Mod: GO

## 2021-09-12 PROCEDURE — 97535 SELF CARE MNGMENT TRAINING: CPT | Mod: GO

## 2021-09-12 PROCEDURE — 97116 GAIT TRAINING THERAPY: CPT | Mod: GP

## 2021-09-12 PROCEDURE — 99217 PR OBSERVATION CARE DISCHARGE: CPT | Performed by: INTERNAL MEDICINE

## 2021-09-12 PROCEDURE — 97162 PT EVAL MOD COMPLEX 30 MIN: CPT | Mod: GP

## 2021-09-12 RX ADMIN — SODIUM CHLORIDE, POTASSIUM CHLORIDE, SODIUM LACTATE AND CALCIUM CHLORIDE: 600; 310; 30; 20 INJECTION, SOLUTION INTRAVENOUS at 11:23

## 2021-09-12 RX ADMIN — SENNOSIDES 8.6 MG: 8.6 TABLET, FILM COATED ORAL at 09:06

## 2021-09-12 RX ADMIN — POLYETHYLENE GLYCOL 3350 17 G: 17 POWDER, FOR SOLUTION ORAL at 09:05

## 2021-09-12 RX ADMIN — ASPIRIN 81 MG: 81 TABLET, DELAYED RELEASE ORAL at 09:06

## 2021-09-12 RX ADMIN — Medication 500 MCG: at 09:06

## 2021-09-12 RX ADMIN — LEVOTHYROXINE SODIUM 100 MCG: 0.05 TABLET ORAL at 06:07

## 2021-09-12 NOTE — PROGRESS NOTES
Care Management Discharge Note    Discharge Date: 09/12/2021       Discharge Disposition: Home Care    Discharge Services:      Discharge DME:      Discharge Transportation: family or friend will provide    Private pay costs discussed: Not applicable    PAS Confirmation Code:    Patient/family educated on Medicare website which has current facility and service quality ratings: yes    Education Provided on the Discharge Plan:    Persons Notified of Discharge Plans: Pt, family and [facility]  Patient/Family in Agreement with the Plan: yes    Handoff Referral Completed: No    Additional Information:  Met with pt and daughter, Gissel, to discuss discharge plans and recommendation for home care.  Pt is in agreement and agrees to referral to home care agency that accepts pt's insurance.    Several home care referrals made -  [facility] accepted pt as new referral and accepts pt's insurance.  Referral information and orders faxed to [facility].  Family will transport pt.        SOREN Prince

## 2021-09-12 NOTE — PROGRESS NOTES
09/12/21 0930   Quick Adds   Type of Visit Initial Occupational Therapy Evaluation   Living Environment   People in home alone  (Dtr lives across the street.  Checks 2x/day on Pt.)   Current Living Arrangements house  (1 level)   Home Accessibility stairs to enter home   Number of Stairs, Main Entrance greater than 10 stairs   Stair Railings, Main Entrance railing on right side (ascending)   Transportation Anticipated family or friend will provide   Living Environment Comments BR with Tub/shower and shower chair.  RTS with vanity on R, FWW and 4WW   Self-Care   Usual Activity Tolerance good   Current Activity Tolerance good   Equipment Currently Used at Home walker, rolling   Disability/Function   Hearing Difficulty or Deaf yes  (Deaf in R; [place] L)   Patient's preferred means of communication verbal   Describe hearing loss bilateral hearing loss   Use of hearing assistive devices none   Wear Glasses or Blind yes   Vision Management glasses   General Information   Onset of Illness/Injury or Date of Surgery 09/11/21   Referring Physician Dr. Ernst Espinoza   Patient/Family Therapy Goal Statement (OT) To return home once the tests are done.   Cognitive Status Examination   Orientation Status orientation to person, place and time   Range of Motion Comprehensive   General Range of Motion bilateral upper extremity ROM WFL   Bed Mobility   Bed Mobility rolling left;rolling right;supine-sit   Rolling Left [place] (Bed Mobility) modified independence   Rolling Right [place] (Bed Mobility) modified independence   Supine-Sit [place] (Bed Mobility) modified independence   Assistive Device (Bed Mobility) bed rails   Transfers   Transfers bed-chair transfer;sit-stand transfer;toilet transfer   Transfer Skill: Bed to Chair/Chair to Bed   Bed-Chair [place] (Transfers) modified independence   Assistive Device (Bed-Chair Transfers) rolling walker   Sit-Stand Transfer   Sit-Stand [place] (Transfers)  modified independence   Assistive Device (Sit-Stand Transfers) walker, front-wheeled   Toilet Transfer   Type (Toilet Transfer) sit-stand;stand-sit   [place] (Toilet Transfer) modified independence   Assistive Device (Toilet Transfer) walker, front-wheeled   Balance   Balance Assessment standing balance: dynamic   Standing Balance: Dynamic WFL   Activities of Daily Living   BADL Assessment lower body dressing;grooming;toileting   Lower Body Dressing Assessment   [place] (Lower Body Dressing) minimum assist (75% patient effort)   Position (Lower Body Dressing) edge of bed sitting   Comment (Lower Body Dressing) Min Assist of one to don socks   Grooming Assessment   [place] (Grooming) modified independence   Position (Grooming) sink side;supported standing   Toileting   [place] (Toileting) modified independence   Assistive Devices (Toileting) raised toilet seat;grab bar, toilet   Position (Toileting) supported sitting;supported standing   Clinical Impression   Criteria for Skilled Therapeutic Interventions Met (OT) yes   OT Diagnosis decreased indep with ADLs due to Acute encephalopathy; A-fib   OT Problem List-Impairments impacting ADL mobility   Assessment of Occupational Performance 1-3 Performance Deficits   Identified Performance Deficits dressing, toileting, G/H   Planned Therapy Interventions (OT) ADL retraining   Clinical Decision Making Complexity (OT) moderate complexity   Therapy Frequency (OT) Daily   Predicted Duration of Therapy 3 days   Risk & Benefits of therapy have been explained patient   OT Discharge Planning    OT Discharge Recommendation (DC Rec) home with home care occupational therapy   OT Rationale for DC Rec Pt is moving slowly with FWW.  Would benefit from home OT eval to assure safety with trsfs and ADLs in her own environment.   Total Evaluation Time (Minutes)   Total Evaluation Time (Minutes) 15

## 2021-09-12 NOTE — PLAN OF CARE
Problem: Muscle Strength Impairment  Goal: Improved Muscle Strength  Outcome: Improving     Problem: Risk for Delirium  Goal: Improved Sleep  Outcome: Improving      Patient denies pain over night. Assist of one with gait belt and walker. Ambulated to the bathroom. Weakness on ambulation. IV  infusing. Bed alarms on for safety. Plan for PT/OT eval today. UA sent for lab.

## 2021-09-12 NOTE — DISCHARGE SUMMARY
[facility] MEDICINE  DISCHARGE SUMMARY     Primary Care Physician: Roland Moore  Admission Date: 9/11/2021   Discharge Provider: Ernst Espinoza DO Discharge Date: 9/12/2021   Diet:   Active Diet and Nourishment Order   Procedures     Regular Diet Adult     Code Status: No CPR- Do NOT Intubate   Activity: DCACTIVITY: Activity as tolerated      Condition at Discharge: Satisfactory     REASON FOR PRESENTATION(See Admission Note for Details)   Generalized weakness and decreased appetite.    PRINCIPAL & ACTIVE DISCHARGE DIAGNOSES     Active Problems:    Atrial fibrillation (H)    Peripheral Neuropathy    Ulcerative Colitis    Hypothyroidism    Joint Pain, Localized In The Knee    H/O mastectomy, right    Acute encephalopathy    Generalized muscle weakness    History of thyroid cancer    History of hip fracture    Anemia, unspecified type    PENDING LABS     Unresulted Labs Ordered in the Past 30 Days of this Admission     Date and Time Order Name Status Description    9/12/2021  4:34 AM Urine Culture In process         PROCEDURES ( this hospitalization only)        RECOMMENDATIONS TO OUTPATIENT PROVIDER FOR F/U VISIT     Primary care provider within 1 week.  Follow-up for malnutrition, normocytic anemia, generalized weakness.    DISPOSITION     Home with home care    SUMMARY OF HOSPITAL COURSE:      89-year-old with history of thyroid cancer, hypothyroidism, atrial fibrillation, hip fracture status post hip surgery, peripheral neuropathy and ulcerative colitis who was admitted for generalized weakness and decreased appetite.  On admission patient was found to be dehydrated.  She received IV fluids and had much improvement in symptoms.  Overall electrolytes were normal.  BMP and hepatic profile were unremarkable.  Patient had mild elevation in BNP of 197.  Free T4 was normal.  Patient had pyuria on UA but no symptoms of urinary tract infection.  No antibiotics were initiated.   Patient wished to be discharged on 9/12.  Physical and Occupational Therapy evaluated the patient and recommended ongoing PT and OT evaluation and treatment at discharge.    Discharge Medications with Med changes:     Current Discharge Medication List      CONTINUE these medications which have NOT CHANGED    Details   acetaminophen (TYLENOL) 500 MG tablet [ACETAMINOPHEN (TYLENOL) 500 MG TABLET] Take 500-1,000 mg by mouth every 6 (six) hours as needed for pain.      aspirin 81 MG EC tablet [ASPIRIN 81 MG EC TABLET] Take 1 tablet (81 mg total) by mouth daily.  Refills: 0    Associated Diagnoses: Dizziness      cholecalciferol, vitamin D3, 1,000 unit tablet [CHOLECALCIFEROL, VITAMIN D3, 1,000 UNIT TABLET] Take 1,000 Units by mouth daily.      cyanocobalamin (VITAMIN B-12) 500 MCG tablet [CYANOCOBALAMIN (VITAMIN B-12) 500 MCG TABLET] Take 500 mcg by mouth daily.             levothyroxine (SYNTHROID, LEVOTHROID) 100 MCG tablet [LEVOTHYROXINE (SYNTHROID, LEVOTHROID) 100 MCG TABLET] TAKE 1 TABLET BY MOUTH DAILY  Qty: 90 tablet, Refills: 3    Associated Diagnoses: Malignant neoplasm of thyroid gland (H)      meclizine (ANTIVERT) 12.5 mg tablet [MECLIZINE (ANTIVERT) 12.5 MG TABLET] Take 1 tablet (12.5 mg total) by mouth 3 (three) times a day as needed.  Qty: 30 tablet, Refills: 0    Associated Diagnoses: Malignant neoplasm of thyroid gland (H)      polyethylene glycol (MIRALAX) 17 gram packet [POLYETHYLENE GLYCOL (MIRALAX) 17 GRAM PACKET] Take 1 packet (17 g total) by mouth daily as needed (constipation).  Refills: 0    Associated Diagnoses: Fall, initial encounter           Rationale for medication changes:    No changes        Consults   SOCIAL WORK IP CONSULT  PHYSICAL THERAPY ADULT IP CONSULT  OCCUPATIONAL THERAPY ADULT IP CONSULT  NUTRITION SERVICES ADULT IP CONSULT    Immunizations given this encounter     Most Recent Immunizations   Administered Date(s) Administered     COVID-19,PF,Pfizer 04/01/2021     DT (PEDS <7y)  08/01/2003     Pneumococcal 23 valent 08/27/1924     Td,adult,historic,unspecified 08/01/2003     Tdap (Adacel,Boostrix) 09/04/2019      Anticoagulation Information      Recent INR results: No results for input(s): INR in the last 168 hours.  Warfarin doses (if applicable) or name of other anticoagulant: N/A      SIGNIFICANT IMAGING FINDINGS     Results for orders placed or performed during the hospital encounter of 09/11/21   XR Chest 2 Views    Impression    IMPRESSION: Cardiomegaly. Mild pulmonary venous congestion. No definite evidence of pulmonary edema. Large lung volumes suggest COPD. Mild scarring bilaterally is unchanged. No pleural effusions. The bones are demineralized.   CT Head w/o Contrast    Impression    IMPRESSION:  1.  No acute intracranial abnormality or significant change compared to the prior study.   Echocardiogram Complete   Result Value Ref Range    LVEF  55-60%        SIGNIFICANT LABORATORY FINDINGS     Most Recent 3 CBC's:Recent Labs   Lab Test 09/11/21 1643 06/21/21 1413 08/07/19 0410 08/04/19  0419   WBC 9.2 8.8  --  9.4   HGB 9.9* 11.6* 11.8* 11.4*   MCV 89 90  --  91   * 201 193 186     Most Recent 3 BMP's:Recent Labs   Lab Test 09/11/21 2005 09/11/21 1643 06/21/21  1413 08/07/19  0410   NA  --  137 136 136   POTASSIUM  --  4.1 4.1 3.8   CHLORIDE  --  106 102 105   CO2  --  24 22 23   BUN  --  16 14 18   CR 0.81 0.85 0.80 0.72   ANIONGAP  --  7 12 8   JULISSA  --  8.8 8.7 9.4   GLC  --  103 93 85     Most Recent 2 LFT's:Recent Labs   Lab Test 09/11/21 1643 06/21/21  1413   AST 17 21   ALT <9 <9   ALKPHOS 58 77   BILITOTAL 1.1* 0.9     Most Recent 3 BNP's:No lab results found.  Most Recent TSH and T4:Recent Labs   Lab Test 09/11/21 1643   TSH 0.15*   T4 1.13     Most Recent Urinalysis:Recent Labs   Lab Test 09/12/21  0350 06/21/21  1416   COLOR Yellow Yellow   APPEARANCE Turbid* Cloudy*   URINEGLC Negative Negative   URINEBILI Negative Small*   URINEKETONE Negative Negative    SG 1.019 1.025   UBLD Negative Negative   URINEPH 5.5 6.0   PROTEIN 10 * Negative   UROBILINOGEN  --  4.0 E.U./dL*   NITRITE Negative Negative   LEUKEST 250 Lucio/uL* Small*   RBCU 6* 0-2   WBCU 8* 10-25*     Discharge Orders     No discharge procedures on file.    Examination   Physical Exam   Temp:  [97.7  F (36.5  C)-98.3  F (36.8  C)] 98.3  F (36.8  C)  Pulse:  [71-82] 82  Resp:  [16-18] 16  BP: (118-146)/(59-69) 130/59  SpO2:  [92 %-95 %] 93 %  Wt Readings from Last 1 Encounters:   09/11/21 59 kg (130 lb)     GENERAL:  Alert, frail, appears comfortable, in no acute distress, appears stated age   HEAD:  Normocephalic, without obvious abnormality, atraumatic   NECK: Supple, symmetrical, trachea midline   LUNGS:   Clear to auscultation bilaterally, no rales, rhonchi, or wheezing, symmetric chest rise on inhalation, respirations unlabored   CHEST WALL:  No tenderness or deformity   HEART:   Irregularly irregular, S1 and S2 normal, no murmur murmur, rub, or gallop    ABDOMEN:   Soft, non-tender, bowel sounds active all four quadrants, no masses, no organomegaly, no rebound or guarding   EXTREMITIES: Extremities normal, atraumatic, no cyanosis or edema.   SKIN: Dry to touch, nail thickening disruption consistent with onychomycosis of toenails.   NEURO: Alert, oriented x3, moves all four extremities freely, non-focal   PSYCH: Cooperative, behavior is appropriate       Please see EMR for more detailed significant labs, imaging, consultant notes etc.    IErnst DO, personally saw the patient today and spent greater than 30 minutes discharging this patient.    Ernst Espinoza DO  [facility]    CC:Roland Moore

## 2021-09-12 NOTE — H&P
[facility]    History and Physical - Hospitalist Service       Date of Admission:  9/11/2021    Assessment & Plan     Physical deconditioning  -  Likely multifactorial.  No causative medication identified.  Malnutrition, possible dehydration possibly contributory.  -  CXR showed signs of cardiomegaly, mild pulmonary venous congestion  -  BNP in process  -  Echo  -  COVID testing negative  -  Head CT did not show explanatory pathology  -  IVF infusion  -  Encourage oral nutritional intake  -  Ambulation with assistance  -  PT   -  OT  -  Fall precautions    Possible dehydration  -  Pt reported poor nutritional and oral intake prior to admission  -  IVF infusion  -  Encourage oral fluid intake    Severe protein calorie malnutrition  -  Encourage oral intake  -  Dietary supplementation  -  Nutrition service consulted    Hypothyroidism  -  Pt has a history of thyroid cancer  -  Continue levothyroxine    Atrial fibrillation  -  Continue aspirin for stroke risk reduction  -  Monitoring hemodynamic state    Osteoarthritis, history of hip fracture status post hip surgery   -  Pain control as needed  -  Supportive care    Peripheral neuropathy  -  Continue cyanocobalamin  -  Supportive care    Constipation  -  Scheduled senna  -  PRN magnesium hydroxide  -  PRN polyethylene glycol       Diet: Regular Diet Adult    DVT Prophylaxis: Enoxaparin (Lovenox) SQ  Rossi Catheter: Not present  Central Lines: None  Code Status: No CPR- Do NOT Intubate                # Platelet Defect: home medication list includes an antiplatelet medication      Disposition Plan   Possible discharge in 2 - 3 days.  May require subacute rehabilitation at a skilled nursing facility.     The patient's care was discussed with the patient, interdisciplinary team.    Javier Jansen MD  [facility]  Securely message with the Vocera Web Console (learn more here)  Text page via [employer]  Paging/Directory      ______________________________________________________________________    Chief Complaint   Weakness    History is obtained from the patient, chart      History of Present Illness   Glory Mason is a 89-year-old lady with a medical history significant for thyroid cancer, hypothyroidism, atrial fibrillation, history of hip fracture status post hip surgery, peripheral neuropathy and ulcerative colitis admitted to the medical service for evaluation and treatment of weakness.  She lives at home and was brought to the Emergency Department by ambulance.  She had been experiencing worsening weakness and decreased appetite that have been going on for a few weeks.  She reported that she has been feeling quite tired.  Reported that she often feels sleepy.  She reported that her daughter has been concerned that she has not been eating and drinking enough.    No report of chest pain or palpitations.  No report of shortness of breath.  No report of localised extremity weakness.  No report of extremity swelling.        Review of Systems    The 10 point Review of Systems is negative other than noted in the HPI or here.     Past Medical History    I have reviewed this patient's medical history and updated it with pertinent information if needed.   Past Medical History:   Diagnosis Date     A-fib (H)      Cancer (H)      Thyroid disease        Past Surgical History   I have reviewed this patient's surgical history and updated it with pertinent information if needed.  Past Surgical History:   Procedure Laterality Date     ABDOMEN SURGERY      perforated ulcer     BIOPSY SKIN (LOCATION)       BREAST SURGERY       C OPEN FIX INTER/SUBTROCH FX,PLATE      Description: Open Treatment Of An Intertrochanteric Fracture;  Recorded: 07/24/2014;     EYE SURGERY      cataracts     FRACTURE SURGERY       MASTECTOMY      Right side      VA MASTECTOMY, SIMPLE, COMPLETE      Description: Simple Mastectomy Right Breast;   Recorded: 10/30/2009;     THYROID SURGERY      malignant nodule removed        Social History   I have reviewed this patient's social history and updated it with pertinent information if needed.  Social History     Tobacco Use     Smoking status: Never Smoker     Smokeless tobacco: Never Used   Substance Use Topics     Alcohol use: Yes     Alcohol/week: 0.0 standard drinks     Comment: Alcoholic Drinks/day: 1/month     Drug use: No       Family History   I have reviewed this patient's family history and updated it with pertinent information if needed.  Family History   Problem Relation Age of Onset     No Known Problems Mother      No Known Problems Father        Prior to Admission Medications   Prior to Admission Medications   Prescriptions Last Dose Informant Patient Reported? Taking?   acetaminophen (TYLENOL) 500 MG tablet 9/10/2021 at Unknown time  Yes Yes   Sig: [ACETAMINOPHEN (TYLENOL) 500 MG TABLET] Take 500-1,000 mg by mouth every 6 (six) hours as needed for pain.   aspirin 81 MG EC tablet 9/10/2021 at Unknown time  No Yes   Sig: [ASPIRIN 81 MG EC TABLET] Take 1 tablet (81 mg total) by mouth daily.   cholecalciferol, vitamin D3, 1,000 unit tablet 9/10/2021 at Unknown time  Yes Yes   Sig: [CHOLECALCIFEROL, VITAMIN D3, 1,000 UNIT TABLET] Take 1,000 Units by mouth daily.   cyanocobalamin (VITAMIN B-12) 500 MCG tablet 9/10/2021 at Unknown time  Yes Yes   Sig: [CYANOCOBALAMIN (VITAMIN B-12) 500 MCG TABLET] Take 500 mcg by mouth daily.          levothyroxine (SYNTHROID, LEVOTHROID) 100 MCG tablet 9/10/2021 at Unknown time  No Yes   Sig: [LEVOTHYROXINE (SYNTHROID, LEVOTHROID) 100 MCG TABLET] TAKE 1 TABLET BY MOUTH DAILY   meclizine (ANTIVERT) 12.5 mg tablet More than a month at Unknown time  No Yes   Sig: [MECLIZINE (ANTIVERT) 12.5 MG TABLET] Take 1 tablet (12.5 mg total) by mouth 3 (three) times a day as needed.   polyethylene glycol (MIRALAX) 17 gram packet More than a month at Unknown time  No Yes   Sig:  [POLYETHYLENE GLYCOL (MIRALAX) 17 GRAM PACKET] Take 1 packet (17 g total) by mouth daily as needed (constipation).      Facility-Administered Medications: None     Allergies   No Known Allergies    Physical Exam   Vital Signs: Temp: 97.7  F (36.5  C) Temp src: Oral BP: 132/66 Pulse: 77   Resp: 16 SpO2: 92 % O2 Device: None (Room air)    Weight: 130 lbs 0 oz  General: Frail-appearing elderly lady.  Awake, alert, appropriately interactive.  Appeared comfortable.  HEENT: Sclera without redness or jaundice.  External ears appear normal  Cardiac: Heart rate controlled, lower extremities not edematous  Pulmonary: Clear to auscultation in bilateral lung fields  Abdomen: Not distended.  Not tender to palpation.  Musculoskeletal: No joint abnormalities noted  Skin: Normally turgid, no rashes noted  Neurologic: Hard of hearing.  Awake, alert, appropriately interactive  Psychiatric: Calm      Data   Data reviewed today: I reviewed all medications, new labs and imaging results over the last 24 hours.  I personally reviewed.    Recent Labs   Lab 09/11/21  1643   WBC 9.2   HGB 9.9*   MCV 89   *      POTASSIUM 4.1   CHLORIDE 106   CO2 24   BUN 16   CR 0.85   ANIONGAP 7   JULISSA 8.8      ALBUMIN 2.5*   PROTTOTAL 5.4*   BILITOTAL 1.1*   ALKPHOS 58   ALT <9   AST 17

## 2021-09-12 NOTE — PROGRESS NOTES
09/12/21 1100   Quick Adds   Quick Adds Certification   Type of Visit Initial PT Evaluation   Living Environment   People in home alone  (Dtr lives across the street. Can check on pt freq. t/o day)   Current Living Arrangements house  (Lives on one level once inside home)   Home Accessibility stairs to enter home   Number of Stairs, Main Entrance greater than 10 stairs  (13 steps per son)   Stair Railings, Main Entrance railing on right side (ascending)   Transportation Anticipated family or friend will provide   Self-Care   Usual Activity Tolerance good   Current Activity Tolerance fair   Equipment Currently Used at Home walker, rolling   Activity/Exercise/Self-Care Comment Family assists pt as needed.   Disability/Function   Hearing Difficulty or Deaf yes  (Deaf in R ear, [place] in L ear)   Patient's preferred means of communication verbal   Describe hearing loss bilateral hearing loss   Use of hearing assistive devices none   Wear Glasses or Blind yes   Vision Management glasses   Fall history within last six months no   General Information   Onset of Illness/Injury or Date of Surgery 09/11/21   Referring Physician Dr. Ernst Espinoza   Patient/Family Therapy Goals Statement (PT) Go home with increased supervision from family.   Pertinent History of Current Problem (include personal factors and/or comorbidities that impact the POC) Admit for increased generalized weakness   Existing Precautions/Restrictions other (see comments)  (chair alarm on)   Weight-Bearing Status - LLE full weight-bearing   Weight-Bearing Status - RLE full weight-bearing   Pain Assessment   Patient Currently in Pain No   Strength   Strength Comments BLE generalized weakness grossly throughout   Transfers   Transfer Safety Comments Sit<>stand from recliner to FWW CGA/min A of 1. Cues for safe hand placement.   Gait/Stairs (Locomotion)   [place] Level (Gait) contact guard;verbal cues   Assistive Device (Gait) walker, front-wheeled    Distance in Feet (Required for LE Total Joints) 50'x1   Pattern (Gait) step-through   Deviations/Abnormal Patterns (Gait) gait speed decreased;stride length decreased   Negotiation (Stairs)   (Declined to trial stairs d/t fatigue. )   Comment (Gait/Stairs) Very slow pace with 3 standing rest breaks due to fatigue, weakness.   Clinical Impression   Criteria for Skilled Therapeutic Intervention yes, treatment indicated   PT Diagnosis (PT) Impaired functional mobility   Influenced by the following impairments weakness, fatigue   Functional limitations due to impairments Transfers, gait, stairs   Clinical Presentation Stable/Uncomplicated   Clinical Presentation Rationale Presents as medically diagnosed.   Clinical Decision Making (Complexity) low complexity   Therapy Frequency (PT) Daily   Predicted Duration of Therapy Intervention (days/wks) 3 days   Planned Therapy Interventions (PT) bed mobility training;gait training;stair training;strengthening;transfer training   Anticipated Equipment Needs at Discharge (PT) walker, rolling  (has at home)   Risk & Benefits of therapy have been explained evaluation/treatment results reviewed;care plan/treatment goals reviewed;risks/benefits reviewed;participants voiced agreement with care plan;participants included;patient;son   PT Discharge Planning    PT Discharge Recommendation (DC Rec) home with assist;home with home care physical therapy   PT Rationale for DC Rec Pt is weak and fatigued. Will require assist/supervision with all mobility at this time. Son present and reports family will assist with whatever pt needs.   Therapy Certification   Start of care date 09/12/21   Certification date from 09/12/21   Certification date to 10/11/21   Medical Diagnosis Generalized muscle weakness   Total Evaluation Time   Total Evaluation Time (Minutes) 10   Coping Strategies   Trust Relationship/Rapport care explained;questions answered  (Son present)   Wellbeing Promotion    Family/Support System Care   (Very supportive family)

## 2021-09-12 NOTE — DISCHARGE INSTRUCTIONS
Home care services have been arranged for you.  Agency: [facility]  Services: PT and OT  Phone:[phone]  Instructions: Home Care will contact you within 24 hours to arrange the first visit.

## 2021-09-12 NOTE — PROGRESS NOTES
[facility]      OUTPATIENT PHYSICAL THERAPY EVALUATION  PLAN OF TREATMENT FOR OUTPATIENT REHABILITATION  (COMPLETE FOR INITIAL CLAIMS ONLY)  Patient's Last Name, First Name, M.I.  YOB: 1924  Glory Mason                        Provider's Name  [facility] Medical Record No.  [id number]                               Onset Date:  09/11/21   Start of Care Date:  09/12/21      Type:     _X_PT   ___OT   ___SLP Medical Diagnosis:  Generalized muscle weakness                        PT Diagnosis:  Impaired functional mobility   Visits from SOC:  1   _________________________________________________________________________________  Plan of Treatment/Functional Goals    Planned Interventions: bed mobility training, gait training, stair training, strengthening, transfer training     Goals: See Physical Therapy Goals on Care Plan in [employer] electronic health record.    Therapy Frequency: Daily  Predicted Duration of Therapy Intervention: 3 days  _________________________________________________________________________________    I CERTIFY THE NEED FOR THESE SERVICES FURNISHED UNDER        THIS PLAN OF TREATMENT AND WHILE UNDER MY CARE     (Physician co-signature of this document indicates review and certification of the therapy plan).                Certification date from: 09/12/21, Certification date to: 10/11/21    Referring Physician: Dr. Ernst Espinoza            Initial Assessment        See Physical Therapy evaluation dated 09/12/21 in Epic electronic health record.

## 2021-09-12 NOTE — UTILIZATION REVIEW
"Admission Status; Secondary Review Determination     Under the authority of the Utilization Management Committee, the utilization review process indicated a secondary review on Glory Mason.  The review outcome is based on review of the medical records, discussions with staff, and applying clinical experience noted on the date of the review.     (x) Observation Status Appropriate - This patient does not meet hospital inpatient criteria and is placed in observation status. If this patient's primary payer is Medicare and was admitted as an inpatient, Condition Code 44 should be used and patient status changed to \"observation\".     RATIONALE FOR DETERMINATION   89-year-old female with a history of thyroid cancer, atrial fibrillation and peripheral neuropathy, and ulcerative colitis admitted with weakness.  Thought secondary to deconditioning/dehydration/malnutrition.  Currently on no IV medications and no further work-up initiated.  Anticipate discharging today after PT/OT evaluation.    The severity of illness, intensity of service provided, expected LOS and risk for adverse outcome make the care appropriate for further observation; however, doesn't meet criteria for hospital inpatient admission. Dr Espinoza notified of this determination and agrees with downgrade of status.      The information on this document is developed by the utilization review team in order for the business office to ensure compliance.  This only denotes the appropriateness of proper admission status and does not reflect the quality of care rendered.         The definitions of Inpatient Status and Observation Status used in making the determination above are those provided in the CMS Coverage Manual, Chapter 1 and Chapter 6, section 70.4.      Sincerely,  Dirk Coleman MD  Utilization Review  Physician Advisor  [facility]  "

## 2021-09-12 NOTE — PLAN OF CARE
Problem: Adult Inpatient Plan of Care  Goal: Plan of Care Review  Outcome: Improving  Flowsheets (Taken 9/12/2021 1102)  Plan of Care Reviewed With: patient  Progress: improving   Pt feeling well, worked well with OT and plans to see PT yet today.  A&O, pleasant.  Echo completed.  Tolerating IVF's.  Up with SBA and walker.  Pt states she wants to eat her breakfast but then she puts food in her mouth she suddenly feels full.    Plan: encourage PO intake.  Anticipate discharge to home later today pending PT eval.

## 2021-09-12 NOTE — PROGRESS NOTES
[facility] Daily Progress Note    Assessment/Plan:  89-year-old with history of thyroid cancer, hypothyroidism, atrial fibrillation, hip fracture status post hip surgery, peripheral neuropathy and ulcerative colitis who was admitted for generalized weakness and decreased appetite.    Deconditioned state.  Multifactorial.  Dehydration and malnutrition.  PT and OT evaluation.  Fall precautions.  Normal sensory 5 cc/h.  May discontinue if tolerating diet.    Covid 19 status:  SARS-CoV-2 PCR negative.  Standard precautions.    Pyuria  Likely pyuria related to patient's age.  No active UTI symptoms.  Would not add antibiotics.  Patient feeling better with IV hydration as above.    Severe protein calorie malnutrition  Encourage oral intake.  Dietary supplementation.  Nutrition service consultation.    Hypothyroidism  History of thyroid cancer.  Continue home levothyroxine.    Atrial fibrillation.  Continue aspirin for stroke risk reduction.  Rate Controlled.  Echocardiogram was ordered and has been completed.  Interpretation pending.    Osteoarthritis  History of hip fracture status post hip surgery.    Peripheral neuropathy  Continue cyanocobalamin supplementation    Constipation  Continue senna, magnesium hydroxide and polyethylene glycol.    Active Problems:    Atrial fibrillation (H)    Peripheral Neuropathy    Ulcerative Colitis    Hypothyroidism    Joint Pain, Localized In The Knee    H/O mastectomy, right    Acute encephalopathy    Generalized muscle weakness    History of thyroid cancer    History of hip fracture     LOS: 1 day     Barriers to discharge: PT and OT evaluation.  Anticipate discharge later today.  Discharge Disposition: Return to previous living situation    Subjective:  Vital signs overnight were stable.  Patient has no new complaints.  She is looking forward to meal this morning.      aspirin  81 mg Oral Daily     enoxaparin ANTICOAGULANT  40 mg Subcutaneous Q24H     levothyroxine  100 mcg Oral Daily      sennosides  8.6 mg Oral BID     sodium chloride (PF)  3 mL Intracatheter Q8H     vitamin B-12  500 mcg Oral Daily       Objective:  Vital signs in last 24 hours:  Temp:  [97.7  F (36.5  C)-98.3  F (36.8  C)] 98.3  F (36.8  C)  Pulse:  [71-82] 82  Resp:  [16-18] 16  BP: (118-146)/(59-69) 130/59  SpO2:  [92 %-95 %] 93 %  Weight:   Weight:   @THISENCWEIGHTS(1)@  Weight change:   Body mass index is 23.78 kg/m .    Intake/Output last 3 shifts:  I/O last 3 completed shifts:  In: 626 [P.O.:260; I.V.:366]  Out: 100 [Urine:100]  Intake/Output this shift:  No intake/output data recorded.    Review of Systems:   As per subjective, all others negative.    Physical Exam:    GENERAL:  Alert, frail, appears comfortable, in no acute distress, appears stated age   HEAD:  Normocephalic, without obvious abnormality, atraumatic   NECK: Supple, symmetrical, trachea midline   LUNGS:   Clear to auscultation bilaterally, no rales, rhonchi, or wheezing, symmetric chest rise on inhalation, respirations unlabored   CHEST WALL:  No tenderness or deformity   HEART:   Irregularly irregular, S1 and S2 normal, no murmur murmur, rub, or gallop    ABDOMEN:   Soft, non-tender, bowel sounds active all four quadrants, no masses, no organomegaly, no rebound or guarding   EXTREMITIES: Extremities normal, atraumatic, no cyanosis or edema.   SKIN: Dry to touch, nail thickening disruption consistent with onychomycosis of toenails.   NEURO: Alert, oriented x3, moves all four extremities freely, non-focal   PSYCH: Cooperative, behavior is appropriate      Cardiographics:   I personally reviewed.  ECG: Atrial fibrillation rate controlled no acute ST or T wave changes  Echocardiogram: Pending    Imaging:  Personally Reviewed.  Results for orders placed or performed during the hospital encounter of 09/11/21   XR Chest 2 Views    Impression    IMPRESSION: Cardiomegaly. Mild pulmonary venous congestion. No definite evidence of pulmonary edema. Large lung volumes  suggest COPD. Mild scarring bilaterally is unchanged. No pleural effusions. The bones are demineralized.   CT Head w/o Contrast    Impression    IMPRESSION:  1.  No acute intracranial abnormality or significant change compared to the prior study.     Lab Results:  Personally Reviewed.   Recent Labs   Lab 09/11/21  1643   WBC 9.2   HGB 9.9*   HCT 30.8*   *     Recent Labs   Lab 09/11/21  1643      CO2 24   BUN 16   ALBUMIN 2.5*   ALKPHOS 58   ALT <9   AST 17     No results for input(s): INR in the last 168 hours.    I reviewed all labs and imaging studies as of this date and I reviewed all current inpatient medications and updated them    Ernst Espinoza DO, MS  [facility]  Internal Medicine

## 2021-09-12 NOTE — PLAN OF CARE
Physical Therapy Discharge Summary    Reason for therapy discharge:    Discharged to home with home therapy.    Progress towards therapy goal(s). See goals on Care Plan in [facility] electronic health record for goal details.  Goals not met.  Barriers to achieving goals:   discharge from facility.    Therapy recommendation(s):    Home with 24/7 supervision from family; Home PT.

## 2021-09-12 NOTE — PLAN OF CARE
Problem: Adult Inpatient Plan of Care  Goal: Absence of Hospital-Acquired Illness or Injury  Intervention: Identify and Manage Fall Risk  Recent Flowsheet Documentation  Taken 9/11/2021 1925 by Jesus Vigil RN  Safety Promotion/Fall Prevention:   assistive device/personal items within reach   activity supervised   nonskid shoes/slippers when out of bed   lighting adjusted   fall prevention program maintained     Problem: Muscle Strength Impairment  Goal: Improved Muscle Strength  Outcome: Improving   Patient alert and oriented. Complained of generalized weakness. VSS. LS clear. Tolerating regular diet. Patient reported loosing significant amount of weight. IVF infusing. PO intake encouraged. Fall precautions in place. Plan PT/OT eval. Will monitor and continue plan of care.

## 2021-09-13 ENCOUNTER — TELEPHONE (OUTPATIENT)
Dept: FAMILY MEDICINE | Facility: CLINIC | Age: 86
End: 2021-09-13

## 2021-09-13 ENCOUNTER — DOCUMENTATION ONLY (OUTPATIENT)
Dept: OTHER | Facility: CLINIC | Age: 86
End: 2021-09-13

## 2021-09-13 LAB
ATRIAL RATE - MUSE: 72 BPM
BACTERIA UR CULT: NO GROWTH
DIASTOLIC BLOOD PRESSURE - MUSE: NORMAL MMHG
INTERPRETATION ECG - MUSE: NORMAL
P AXIS - MUSE: NORMAL DEGREES
PR INTERVAL - MUSE: 232 MS
QRS DURATION - MUSE: 88 MS
QT - MUSE: 394 MS
QTC - MUSE: 431 MS
R AXIS - MUSE: 14 DEGREES
SYSTOLIC BLOOD PRESSURE - MUSE: NORMAL MMHG
T AXIS - MUSE: 5 DEGREES
VENTRICULAR RATE- MUSE: 72 BPM

## 2021-09-13 NOTE — TELEPHONE ENCOUNTER
Looks like pt was in the hospital over the weekend. I would recommend they schedule a hospital follow up visit with Dr. Moore

## 2021-09-13 NOTE — TELEPHONE ENCOUNTER
Reason for Call:  Home Health Care    Ava with [facility] called regarding (reason for call): updated Dx    Skilled Nursing: Home Care Orders need updated diagnosis- current order states: physical deep conditioning. Which condition is causing that diagnosis? Updated orders need a faxed copy sent to the fax below.     Pt Provider: Roland Moore MD    Phone Number Homecare Nurse can be reached at:   Phone: [phone]  Fax: [phone]    Can we leave a detailed message on this number? Yes      Call taken on 9/13/2021 at 2:40 PM by Cheri Roberson

## 2021-09-13 NOTE — TELEPHONE ENCOUNTER
Patient scheduled for 9/22/2021. Closing encounter.  
Reason for call:  Other   Patient called regarding (reason for call): appointment  Additional comments: RENE RODRIGUEZ ADMIT SATURDAY 09/11/21 DISCHARGED 09/12/21 DEHYDRATION    Phone number to reach patient:  Cell number on file:    Telephone Information:   Mobile [phone]       Best Time:  Anytime    Can we leave a detailed message on this number?  YES    Travel screening: Negative  
foreign body, nose

## 2021-09-13 NOTE — PLAN OF CARE
Occupational Therapy Discharge Summary    Reason for therapy discharge:    Discharged to home with home therapy.    Progress towards therapy goal(s). See goals on Care Plan in [facility] electronic health record for goal details.  Goals partially met.  Barriers to achieving goals:   discharge from facility.    Therapy recommendation(s):    Continued therapy is recommended.  Rationale/Recommendations:  To increase indep with ADLs and trsfs.

## 2021-09-16 ENCOUNTER — MEDICAL CORRESPONDENCE (OUTPATIENT)
Dept: HEALTH INFORMATION MANAGEMENT | Facility: CLINIC | Age: 86
End: 2021-09-16
Payer: COMMERCIAL

## 2021-09-16 ENCOUNTER — TELEPHONE (OUTPATIENT)
Dept: FAMILY MEDICINE | Facility: CLINIC | Age: 86
End: 2021-09-16
Payer: COMMERCIAL

## 2021-09-16 NOTE — TELEPHONE ENCOUNTER
FYI: Verbal orders given per LUZ ELENA Moore . Please notify us if any adjustments need to be made.

## 2021-09-16 NOTE — TELEPHONE ENCOUNTER
Reason for Call:  Home Health Care     Shawn  with [facility] called regarding  Orders are needed for this patient:     PT: 1 X week for 1 week, 2 x week for 3 weeks, then 1 X 1 week and 2 PRN     Home Health Aid 1 X per week     OT: Eval and Treat     Phone Number Homecare Nurse can be reached at: [phone]    Call taken on 9/16/2021 at 1:57 PM by Rosmery Mcelory RN

## 2021-09-21 ENCOUNTER — TELEPHONE (OUTPATIENT)
Dept: FAMILY MEDICINE | Facility: CLINIC | Age: 86
End: 2021-09-21

## 2021-09-21 NOTE — TELEPHONE ENCOUNTER
FYI: Verbal orders given per Dr Chuckie Coyle/covering for Dr Roland Moore . Please notify us if any adjustments need to be made.

## 2021-09-21 NOTE — TELEPHONE ENCOUNTER
Reason for Call:  Home Health Care    Sheeba with [facility] called regarding (reason for call): VERBAL orders    Orders are needed for this patient. OT- VERBAL Orders    OT: 2 x a wk for 3 wks, 1 x a wk for 2 wks and 2 PRN's    Pt Provider: Dr Roland Moore    Phone Number Homecare Nurse can be reached at: [phone]    Can we leave a detailed message on this number? YES    Phone number patient can be reached at: Home number on file [phone] (home)    Best Time: anytime    Call taken on 9/21/2021 at 1:24 PM by Anita Franklin

## 2021-09-22 ENCOUNTER — OFFICE VISIT (OUTPATIENT)
Dept: FAMILY MEDICINE | Facility: CLINIC | Age: 86
End: 2021-09-22
Payer: COMMERCIAL

## 2021-09-22 ENCOUNTER — MEDICAL CORRESPONDENCE (OUTPATIENT)
Dept: HEALTH INFORMATION MANAGEMENT | Facility: CLINIC | Age: 86
End: 2021-09-22

## 2021-09-22 VITALS — DIASTOLIC BLOOD PRESSURE: 58 MMHG | OXYGEN SATURATION: 97 % | SYSTOLIC BLOOD PRESSURE: 108 MMHG | HEART RATE: 71 BPM

## 2021-09-22 DIAGNOSIS — M62.81 GENERALIZED MUSCLE WEAKNESS: Primary | ICD-10-CM

## 2021-09-22 DIAGNOSIS — M62.59 MUSCLE WASTING AND ATROPHY, NOT ELSEWHERE CLASSIFIED, MULTIPLE SITES: ICD-10-CM

## 2021-09-22 DIAGNOSIS — K59.01 SLOW TRANSIT CONSTIPATION: ICD-10-CM

## 2021-09-22 DIAGNOSIS — R62.7 ADULT FAILURE TO THRIVE: ICD-10-CM

## 2021-09-22 DIAGNOSIS — D64.9 ANEMIA, UNSPECIFIED TYPE: ICD-10-CM

## 2021-09-22 PROCEDURE — 99214 OFFICE O/P EST MOD 30 MIN: CPT | Performed by: FAMILY MEDICINE

## 2021-09-22 ASSESSMENT — PATIENT HEALTH QUESTIONNAIRE - PHQ9: SUM OF ALL RESPONSES TO PHQ QUESTIONS 1-9: 17

## 2021-09-22 NOTE — PROGRESS NOTES
Hospital Follow-up Visit:    Hospital/Nursing Home/IP Rehab Facility: [facility]  Date of Admission: 09/11/2021  Date of Discharge: 09/12/2021  Reason(s) for Admission: Dehydration + Malnutrition      Was your hospitalization related to COVID-19? No   Problems taking medications regularly:  None  Medication changes since discharge: None  Problems adhering to non-medication therapy:  None    Summary of hospitalization:  [facility] discharge summary reviewed  Diagnostic Tests/Treatments reviewed.  Follow up needed: 6 weeks repeat CBC metabolic profile urine  Other Healthcare Providers Involved in Patient s Care: Follow-up 6 weeks in office         Homecare  Update since discharge: improved. Post Discharge Medication Reconciliation: discharge medications reconciled, continue medications without change.  Plan of care communicated with patient and family  Subjective: Patient was hospitalized overnight because of generalized weakness; she was seen and evaluated in the emergency room kept overnight was given fluid flush x4 L of half-normal saline with improvement.  Work-up included hemogram electrolytes echocardiogram EKG; patient had mild anemia probably related to age and chronic illness.  Hemoglobin discharge 9.7.  Electrolytes were normal.  Patient was discharged with a diagnosis of weakness and dehydration; since that time she has had occupational therapy physical therapy and nurse evaluation and currently lives at home and is being visited by her daughter sons and grandchildren on a regular basis.  She is started Ensure she is eating small meals.  She is clinically improved here she does watch a little television and notes that the political climate is a little crazy as she says.  I had a long visit with the family and expressed concern that it is important for her personally to stay at home for a year and we will try to do that but we do and will require ongoing home  health care visits.  In 6 weeks time and repeat the blood chemistries but we encourage the patient directly to eat she is also having difficulty with her bowels we added some MiraLAX a teaspoon daily.  She does need for soluble fiber and we did suggest applesauce.  Importance of Ensure was rediscussed.  Physical:General Appearance:  Alert, cooperative, no distress patient is in a wheelchair  Head:  Normocephalic, no obvious abnormality  Ears: TM anatomy normal  Eyes:  PERRL, EOM's intact, conjunctiva and corneas clear  Nose:  Nares symmetrical, septum midline, mucosa pink, no sinus tenderness  Throat:  Lips, tongue, and mucosa are moist, pink, and intact  Neck:  Supple, symmetrical, trachea midline, no adenopathy; thyroid: no enlargement, symmetric,no tenderness/mass/nodules; no carotid bruit, no JVD  Back:  Symmetrical, no curvature, ROM normal, no CVA tenderness  Chest/Breast:  No mass or tenderness  Lungs:  Clear to auscultation bilaterally, respirations unlabored   Heart:  Normal PMI, regular rate & rhythm, S1 and S2 normal, no murmurs, rubs, or gallops  Abdomen:  Soft, non-tender, bowel sounds active all four quadrants, no mass, or organomegaly  Musculoskeletal:  Tone and strength strong and symmetrical, all extremities; she does have 1-2+ edema worse on the right than on the left this was previous to her hospitalization  Lymphatic:  No adenopathy patient has obvious muscle wasting and generalized multiple sites  Skin/Hair/Nails:  Skin warm, dry, and intact, no rashes atrophy is consistent with nutritional discharge deficiency  Neurologic:  Alert and oriented x3, no cranial nerve deficits, normal strength and tone, gait steady  Extremities:  No edema.  Blu's sign negative.    Genitourinary: deferred  Pulses:  Equal bilaterally   Diagnoses: 1. Adult Failure to Thrive  2. Anemia D64.9 3. M62.81     Roland Moore M.D.

## 2021-09-24 ENCOUNTER — TELEPHONE (OUTPATIENT)
Dept: FAMILY MEDICINE | Facility: CLINIC | Age: 86
End: 2021-09-24

## 2021-09-24 NOTE — TELEPHONE ENCOUNTER
Reason for Call:  Home Health Care    Shawn with [facility] called regarding:    Skilled Nursing: Eval and Treat    Pt Provider:     Phone Number Homecare Nurse can be reached at: [phone]    Can we leave a detailed message on this number? YES    Call taken on 9/24/2021 at 10:14 AM by Cheri Roberson

## 2021-09-27 ENCOUNTER — TELEPHONE (OUTPATIENT)
Dept: FAMILY MEDICINE | Facility: CLINIC | Age: 86
End: 2021-09-27

## 2021-09-27 NOTE — PROGRESS NOTES
[facility]      OUTPATIENT OCCUPATIONAL THERAPY  EVALUATION  PLAN OF TREATMENT FOR OUTPATIENT REHABILITATION  (COMPLETE FOR INITIAL CLAIMS ONLY)  Patient's Last Name, First Name, M.I.  YOB: 1924  Glory Mason                          Provider's Name  [facility] Medical Record No.  [id number]                               Onset Date:  09/11/21   Start of Care Date:  09/12/21     Type:     ___PT   _X_OT   ___SLP Medical Diagnosis:  (P) Acute Encephalopathy                        OT Diagnosis:  decreased indep with ADLs due to Acute encephalopathy; A-fib   Visits from SOC:  1   _________________________________________________________________________________  Plan of Treatment/Functional Goals    Planned Interventions: ADL retraining   Goals: See Occupational Therapy Goals on Care Plan in [facility] electronic health record.    Therapy Frequency: Daily  Predicted Duration of Therapy Intervention: 3 days  _________________________________________________________________________________    I CERTIFY THE NEED FOR THESE SERVICES FURNISHED UNDER        THIS PLAN OF TREATMENT AND WHILE UNDER MY CARE     (Physician co-signature of this document indicates review and certification of the therapy plan).                Certification date from: 09/12/21, Certification date to: 10/12/21    Referring Physician: Dr. Ernst Espinoza            Initial Assessment        See Occupational Therapy evaluation dated 09/12/21 in Epic electronic health record.

## 2021-09-27 NOTE — TELEPHONE ENCOUNTER
Nurse Joshi- from [facility] is calling to request verbal order  for PT.    Skill nurse visits-one time a week, for 4 weeks, with 4 PRN visits.

## 2021-09-28 ENCOUNTER — TELEPHONE (OUTPATIENT)
Dept: FAMILY MEDICINE | Facility: CLINIC | Age: 86
End: 2021-09-28

## 2021-09-28 NOTE — TELEPHONE ENCOUNTER
Sarah from [facility] calling to report abnormal vitals.   At today's visit  BP 80/52  O2 saturation 88%  Pulse was 78  Temp:98.0  Patient was very lethargic and poor appetite

## 2021-09-29 NOTE — TELEPHONE ENCOUNTER
Faxed office note from 9/22 with new diagnostic codes for insurance. Sarah stated that it would be good to have somewhere in the office note stating hospice eval recommended.     Gave Sarah the Verbal OK to discontinue OT and PT per Dr. Moore.

## 2021-09-29 NOTE — TELEPHONE ENCOUNTER
Perhaps the patient will rally in the next day or so if not the family should be aware that our options are somewhat limited at this point in time and I am sure earlier all attending her at this point.  I do not know if we should be really aggressive about reversing the situation at this point.

## 2021-09-29 NOTE — TELEPHONE ENCOUNTER
Spoke with Sarah and informed her of Dr. Simental message. Sarah was in agreement with Dr. Moore and Sarah stated that PT/OT is too much for patient and that hospice was brought up to family but they were very quiet about it. Sarah would like office note faxed so they have it and if it could suggest that Hospice would be the best route for patient at this time so she is comfortable.        Oriented - self; Oriented - place; Oriented - time

## 2021-10-04 ENCOUNTER — MEDICAL CORRESPONDENCE (OUTPATIENT)
Dept: HEALTH INFORMATION MANAGEMENT | Facility: CLINIC | Age: 86
End: 2021-10-04
Payer: COMMERCIAL

## 2021-10-06 ENCOUNTER — TELEPHONE (OUTPATIENT)
Dept: FAMILY MEDICINE | Facility: CLINIC | Age: 86
End: 2021-10-06

## 2021-10-06 ENCOUNTER — MEDICAL CORRESPONDENCE (OUTPATIENT)
Dept: HEALTH INFORMATION MANAGEMENT | Facility: CLINIC | Age: 86
End: 2021-10-06

## 2021-10-06 NOTE — TELEPHONE ENCOUNTER
Chloe is calling wanting to get the verbal ok that Dr. Moore will be the follow for hospice care.

## 2021-10-08 ENCOUNTER — MEDICAL CORRESPONDENCE (OUTPATIENT)
Dept: HEALTH INFORMATION MANAGEMENT | Facility: CLINIC | Age: 86
End: 2021-10-08

## 2021-10-17 ENCOUNTER — HEALTH MAINTENANCE LETTER (OUTPATIENT)
Age: 86
End: 2021-10-17

## 2021-10-19 ENCOUNTER — MEDICAL CORRESPONDENCE (OUTPATIENT)
Dept: HEALTH INFORMATION MANAGEMENT | Facility: CLINIC | Age: 86
End: 2021-10-19

## 2021-10-29 ENCOUNTER — TELEPHONE (OUTPATIENT)
Dept: FAMILY MEDICINE | Facility: CLINIC | Age: 86
End: 2021-10-29
Payer: COMMERCIAL

## 2021-10-29 NOTE — TELEPHONE ENCOUNTER
Sangita with [facility] is calling wanting to know if   will be signing the death certificate for this patient.     If  is not ok with signing please notify the  home so they can reach out elsewhere.     Sangita said it was sent to Dr. Moore's e-mail. Please check that and sign off as soon as possible as  date was 10/13 and they are needing to get this finalized.

## 2021-11-08 ENCOUNTER — TELEPHONE (OUTPATIENT)
Dept: FAMILY MEDICINE | Facility: CLINIC | Age: 86
End: 2021-11-08
Payer: COMMERCIAL

## 2021-11-08 NOTE — TELEPHONE ENCOUNTER
Reason for Call:  Other call back    Detailed comments: Recvd call from Jaclyn rios/[facility], she is looking for a DX addended in a visit note from 09.22.2021. DX was added under description but this will need to be added in the note where Dr Moore narrated.    Order on Oct 8th was placed for Homecare Services and this needs to be reflected in the 09.22.2021 clinical note.    Jaclyn does have access to Epic and has reviewed what it is they need for insurance coverage for services. Pls contact Jaclyn with any questions     Phone Number Patient can be reached at: Other phone number:  [phone]    Best Time: anytime    Can we leave a detailed message on this number? YES    Call taken on 11/8/2021 at 2:27 PM by Anita Franklin

## 2022-07-24 ENCOUNTER — HEALTH MAINTENANCE LETTER (OUTPATIENT)
Age: 87
End: 2022-07-24

## 2022-10-02 ENCOUNTER — HEALTH MAINTENANCE LETTER (OUTPATIENT)
Age: 87
End: 2022-10-02

## 2023-08-12 ENCOUNTER — HEALTH MAINTENANCE LETTER (OUTPATIENT)
Age: 88
End: 2023-08-12

## 2024-10-05 ENCOUNTER — HEALTH MAINTENANCE LETTER (OUTPATIENT)
Age: 89
End: 2024-10-05